# Patient Record
Sex: MALE | Race: BLACK OR AFRICAN AMERICAN | NOT HISPANIC OR LATINO | URBAN - METROPOLITAN AREA
[De-identification: names, ages, dates, MRNs, and addresses within clinical notes are randomized per-mention and may not be internally consistent; named-entity substitution may affect disease eponyms.]

---

## 2018-03-08 DIAGNOSIS — I10 ESSENTIAL HYPERTENSION: Primary | ICD-10-CM

## 2018-03-08 DIAGNOSIS — E78.49 OTHER HYPERLIPIDEMIA: ICD-10-CM

## 2018-03-08 RX ORDER — ATORVASTATIN CALCIUM 20 MG/1
1 TABLET, FILM COATED ORAL DAILY
COMMUNITY
Start: 2017-09-21 | End: 2018-03-08 | Stop reason: SDUPTHER

## 2018-03-08 RX ORDER — SPIRONOLACTONE 50 MG/1
1 TABLET, FILM COATED ORAL DAILY
COMMUNITY
Start: 2017-10-04 | End: 2023-05-18

## 2018-03-08 RX ORDER — ALLOPURINOL 300 MG/1
1 TABLET ORAL DAILY
COMMUNITY
Start: 2017-09-21 | End: 2018-03-14 | Stop reason: SDUPTHER

## 2018-03-08 RX ORDER — PANTOPRAZOLE SODIUM 40 MG/1
1 TABLET, DELAYED RELEASE ORAL DAILY
COMMUNITY
Start: 2017-10-31 | End: 2023-05-18

## 2018-03-08 RX ORDER — AMLODIPINE BESYLATE 10 MG/1
10 TABLET ORAL DAILY
Qty: 90 TABLET | Refills: 1 | Status: SHIPPED | OUTPATIENT
Start: 2018-03-08 | End: 2018-06-22 | Stop reason: SDUPTHER

## 2018-03-08 RX ORDER — ATORVASTATIN CALCIUM 20 MG/1
20 TABLET, FILM COATED ORAL DAILY
Qty: 90 TABLET | Refills: 1 | Status: SHIPPED | OUTPATIENT
Start: 2018-03-08 | End: 2018-06-22 | Stop reason: SDUPTHER

## 2018-03-08 RX ORDER — AMLODIPINE BESYLATE 10 MG/1
1 TABLET ORAL DAILY
COMMUNITY
Start: 2017-09-21 | End: 2018-03-08 | Stop reason: SDUPTHER

## 2018-03-08 RX ORDER — MINERAL OIL
1 ENEMA (ML) RECTAL DAILY
COMMUNITY
Start: 2012-12-06 | End: 2023-05-18

## 2018-03-08 RX ORDER — QUINAPRIL 40 MG/1
1 TABLET ORAL DAILY
COMMUNITY
Start: 2017-10-04 | End: 2018-04-02

## 2018-03-14 RX ORDER — ALLOPURINOL 300 MG/1
300 TABLET ORAL DAILY
Qty: 90 TABLET | Refills: 0 | Status: SHIPPED | OUTPATIENT
Start: 2018-03-14 | End: 2018-04-14 | Stop reason: SDUPTHER

## 2018-04-12 ENCOUNTER — PATIENT OUTREACH (OUTPATIENT)
Dept: CASE MANAGEMENT | Facility: CLINIC | Age: 53
End: 2018-04-12

## 2018-04-12 NOTE — PROGRESS NOTES
NAME: Camden Sheth    MRN: [id number]    YOB: 1965    EVENT DETAILS    Discharging Facility: [facility]  Date of Admission: 04/09/18  Date of Discharge: 04/11/18  Discharge Instructions Reviewed?: Yes       Wife clarified that pt sees Dr. Muhammad.     Reason for Admission:  Bezoar      HPI: Spoke with pt's wife as pt had returned to work. Pt admitted with a recurrent Bezoar. Per wife, pt has had multiple episodes and he is unable to manage the pain at home. He requires high doses of pain medication and nausea medication. In the past, pt required surgery. Wife says they try to avoid surgery because pt developed kidney disease after the surgery.     Per wife, pt still has abdominal pain, but more manageable. Pt was told in the past to limit skins on fruits and vegetables. Wife would like pt to see a dietician. Encouraged her to check their benefit and have PCP write a script, especially with his renal disease. Encouraged wife/pt to discuss with PCP.         MEDICATION REVIEW:    Reported by:: Spouse or Significant Other  Prescriptions Filled?: Yes     Was a medication discrepancy indentified?: No     Medication understanding?: Yes     Medication Adherence?: Yes     Any side effects from medication?: No       Medication review Reviewed, see medication history    Additional Comments:      FOLLOW-UP TESTS/PROCEDURES:    PCP: will have office assist with appt      BARRIERS TO CARE    Self-Care   Living Arrangement: Spouse or Significant Other (Pt lives with his wife and daughter. Pt independent in his care. Pt works.)       Socioeconomic  Financial Problems?: No     Transportation Issues?: No            PLAN OF CARE:    Reviewed signs/symptoms of worsening condition or complication that necessitate a call to the Physician's office.  Educated patient on access to care.  RN phone number given for future care management needs.       Ronit Wells RN  [phone]

## 2018-04-13 ENCOUNTER — TELEPHONE (OUTPATIENT)
Dept: FAMILY MEDICINE | Facility: CLINIC | Age: 53
End: 2018-04-13

## 2018-04-13 NOTE — TELEPHONE ENCOUNTER
Called pt to schedule TCM appointment   And pt's wife said patient will be following up with   GI specialist for his symptoms.    Pt's wife however did reschedule pt's PE appointment   New date is 5/8/2018.          Please see message from care nurse  Listed below    From: Ronit Wells RN   Sent: 4/12/2018  10:26 AM   To: Roseanna Gonzales   Subject: TCM appt needed                                   TCM appt needed. Pt discharged from [facility] 4/11 with a Bezoar. Ideally, pt should be seen by 4/25. Please call wife to schedule. She has an appt on 4/16 and was hoping they could come in together. Please call wife on her cell [phone]. Thanks. Ronit

## 2018-04-16 RX ORDER — ALLOPURINOL 300 MG/1
TABLET ORAL
Qty: 90 TABLET | Refills: 1 | Status: SHIPPED | OUTPATIENT
Start: 2018-04-16 | End: 2018-10-31 | Stop reason: SDUPTHER

## 2018-05-08 ENCOUNTER — OFFICE VISIT (OUTPATIENT)
Dept: FAMILY MEDICINE | Facility: CLINIC | Age: 53
End: 2018-05-08
Payer: COMMERCIAL

## 2018-05-08 VITALS
WEIGHT: 242.8 LBS | DIASTOLIC BLOOD PRESSURE: 82 MMHG | SYSTOLIC BLOOD PRESSURE: 122 MMHG | BODY MASS INDEX: 31.16 KG/M2 | OXYGEN SATURATION: 98 % | HEART RATE: 66 BPM | HEIGHT: 74 IN | TEMPERATURE: 98 F

## 2018-05-08 DIAGNOSIS — R80.9 PROTEINURIA, UNSPECIFIED TYPE: ICD-10-CM

## 2018-05-08 DIAGNOSIS — E78.2 MULTIPLE-TYPE HYPERLIPIDEMIA: ICD-10-CM

## 2018-05-08 DIAGNOSIS — I10 BENIGN ESSENTIAL HYPERTENSION: ICD-10-CM

## 2018-05-08 DIAGNOSIS — M54.5 CHRONIC LOW BACK PAIN, UNSPECIFIED BACK PAIN LATERALITY, WITH SCIATICA PRESENCE UNSPECIFIED: ICD-10-CM

## 2018-05-08 DIAGNOSIS — G89.29 CHRONIC LOW BACK PAIN, UNSPECIFIED BACK PAIN LATERALITY, WITH SCIATICA PRESENCE UNSPECIFIED: ICD-10-CM

## 2018-05-08 DIAGNOSIS — R73.01 IMPAIRED FASTING GLUCOSE: ICD-10-CM

## 2018-05-08 DIAGNOSIS — Z00.00 ENCOUNTER FOR GENERAL ADULT MEDICAL EXAMINATION WITHOUT ABNORMAL FINDINGS: Primary | ICD-10-CM

## 2018-05-08 PROBLEM — M54.50 CHRONIC LOW BACK PAIN: Status: ACTIVE | Noted: 2018-05-08

## 2018-05-08 PROCEDURE — 99396 PREV VISIT EST AGE 40-64: CPT | Performed by: FAMILY MEDICINE

## 2018-05-08 RX ORDER — TRAMADOL HYDROCHLORIDE 50 MG/1
50 TABLET ORAL DAILY PRN
Refills: 0 | COMMUNITY
Start: 2018-04-24 | End: 2018-05-08 | Stop reason: SDUPTHER

## 2018-05-08 RX ORDER — TRAMADOL HYDROCHLORIDE 50 MG/1
50 TABLET ORAL DAILY PRN
Qty: 30 TABLET | Refills: 0 | Status: SHIPPED | OUTPATIENT
Start: 2018-05-08 | End: 2018-06-07

## 2018-05-08 RX ORDER — CYCLOBENZAPRINE HCL 10 MG
10 TABLET ORAL DAILY PRN
Qty: 30 TABLET | Refills: 1 | Status: SHIPPED | OUTPATIENT
Start: 2018-05-08 | End: 2018-08-06 | Stop reason: SDUPTHER

## 2018-05-08 ASSESSMENT — ENCOUNTER SYMPTOMS
VOMITING: 0
CONSTIPATION: 0
SINUS PRESSURE: 0
FEVER: 0
HEMATURIA: 0
DIZZINESS: 0
WHEEZING: 0
ABDOMINAL PAIN: 0
SORE THROAT: 0
EYE PAIN: 0
DIARRHEA: 0
NUMBNESS: 0
NERVOUS/ANXIOUS: 0
ARTHRALGIAS: 0
EYE ITCHING: 0
SHORTNESS OF BREATH: 0
COUGH: 0
PALPITATIONS: 0
CHILLS: 0
NAUSEA: 0
BACK PAIN: 0
DYSURIA: 0
VOICE CHANGE: 0

## 2018-05-08 NOTE — ASSESSMENT & PLAN NOTE
Stable, cont good bp control to prevent worsening of ckd  Decrease salt intake  Increase cardiovascular exercise

## 2018-05-08 NOTE — ASSESSMENT & PLAN NOTE
Counseled diet/nutrition and exercise  Pt to have cmp done through Dr. Tejeda's office. Will monitor

## 2018-05-08 NOTE — PROGRESS NOTES
"Subjective      Patient ID: Camden Sheth is a 52 y.o. male.    HPI    Pt is here for physical exam and health maintenance visit.   Reports hospital visit 2 weeks ago for difficulty swallowing, which is now resolved.  He endorses history of acid reflux,which he says is well controlle with current medication. Pt is aware that certain foods affect it and is trying to maintain a balanced healthy diet.   Exercise through bike riding and local sports, basketball and baseball, with friends.    Specialist follow ups:  To see nephrology, Dr Tejeda, this month for lab work and followup.   Reports colonoscopy done \" a few years ago.\" Report in system from 2011, with follow up in 10 years. Will see GI, Dr Bernardo, in June.            Review of Systems     ROS reviewed and otherwise negative.       Objective     Vitals:    05/08/18 1101   Pulse: 66   Temp: 36.7 °C (98 °F)   SpO2: 98%   Weight: 110 kg (242 lb 12.8 oz)   Height: 1.88 m (6' 2\")     Body mass index is 31.17 kg/m².     Bp: 122/ 82       Physical Exam      Assessment and Plan:  1. Hypertension  Reading today was within normal limits, thus no medication changes suggested at this point.   Continue to maintain healthy lifestyle with diet and exercise as discussed.     2. Hyperlipidemia   Blood work required to assess current status. To get lipid panel completed as well when he goes for nephrology blood work. Continue statin therapy.     3. To follow up with nephrology and GI as noted above     "

## 2018-05-08 NOTE — ASSESSMENT & PLAN NOTE
Cont tramadol and cyclobenzaprine one tablet daily prn  Pt has been seen by ortho in past  No surgical intervention  Pa pdmp verified

## 2018-05-08 NOTE — PROGRESS NOTES
Subjective      Patient ID: Camden Sheth is a 52 y.o. male.      HPI  Patient presents today for physical exam.    Social History     Social History   • Marital status:      Spouse name: N/A   • Number of children: N/A   • Years of education: N/A     Occupational History   • Not on file.     Social History Main Topics   • Smoking status: Never Smoker   • Smokeless tobacco: Never Used   • Alcohol use No   • Drug use: No   • Sexual activity: Yes     Partners: Female     Other Topics Concern   • Not on file     Social History Narrative   • No narrative on file       Family History   Problem Relation Age of Onset   • Diabetes Mother    • Hypertension Mother    • Hypertension Father    • Diabetes Brother        Past Surgical History:   Procedure Laterality Date   • ABDOMINAL SURGERY  2013       Past Medical History:   Diagnosis Date   • Hypertension    • Proteinuria        Goals     None          The following have been reviewed and updated as appropriate in this visit:  Tobacco  Allergies  Meds  Problems  Med Hx  Surg Hx  Fam Hx  Soc Hx        Review of Systems   Constitutional: Negative for chills and fever.   HENT: Negative for ear pain, hearing loss, postnasal drip, sinus pressure, sore throat and voice change.    Eyes: Negative for pain, itching and visual disturbance.   Respiratory: Negative for cough, shortness of breath and wheezing.    Cardiovascular: Negative for chest pain and palpitations.   Gastrointestinal: Negative for abdominal pain, constipation, diarrhea, nausea and vomiting.   Genitourinary: Negative for dysuria and hematuria.   Musculoskeletal: Negative for arthralgias and back pain.   Skin: Negative for rash.   Neurological: Negative for dizziness and numbness.   Psychiatric/Behavioral: The patient is not nervous/anxious.        Objective     Vitals:    05/08/18 1101   BP: 122/82   Pulse: 66   Temp: 36.7 °C (98 °F)   SpO2: 98%   Weight: 110 kg (242 lb 12.8 oz)   Height: 1.88 m (6'  "2\")     Body mass index is 31.17 kg/m².    Physical Exam   Constitutional: He appears well-developed and well-nourished.   HENT:   Head: Normocephalic and atraumatic.   Right Ear: External ear normal.   Left Ear: External ear normal.   Nose: Nose normal.   Mouth/Throat: Oropharynx is clear and moist.   Eyes: Conjunctivae and EOM are normal. Pupils are equal, round, and reactive to light.   Neck: Normal range of motion. Neck supple. No thyromegaly present.   Cardiovascular: Normal rate, regular rhythm, normal heart sounds and intact distal pulses.  Exam reveals no gallop and no friction rub.    No murmur heard.  Pulmonary/Chest: Effort normal and breath sounds normal. No respiratory distress. He has no wheezes. He has no rales.   Abdominal: Soft. Bowel sounds are normal. There is no tenderness.   Musculoskeletal: He exhibits no edema or tenderness.   Lymphadenopathy:     He has no cervical adenopathy.   Neurological: No cranial nerve deficit.   Skin: Skin is warm and dry. No rash noted. No erythema.   Psychiatric: He has a normal mood and affect. His behavior is normal. Judgment and thought content normal.       Assessment/Plan   Problem List Items Addressed This Visit     Benign essential hypertension     Stable, cont good bp control to prevent worsening of ckd  Decrease salt intake  Increase cardiovascular exercise         Impaired fasting glucose     Counseled diet/nutrition and exercise  Pt to have cmp done through Dr. Tejeda's office. Will monitor         Multiple-type hyperlipidemia     Cont statin  Pt has script to get lipid panel done when he goes to get labs done for Dr. Tejeda         Relevant Orders    Lipid panel    Proteinuria     Managed and followed by dr. Tejeda  Avoid NSAIDs  Good bp control         Chronic low back pain     Cont tramadol and cyclobenzaprine one tablet daily prn  Pt has been seen by ortho in past  No surgical intervention  Pa pdmp verified             Other Visit Diagnoses     Encounter " for general adult medical examination without abnormal findings    -  Primary    counseled diet/nutrition and exercise - focus on stretching to prevent injury (pt plays bball and softball a few times a month)        Bezoar - has follow up with GI at [place] will find out from them about next colonoscopy (has had 2 prior to the age of 52)

## 2018-06-22 DIAGNOSIS — I10 ESSENTIAL HYPERTENSION: ICD-10-CM

## 2018-06-22 DIAGNOSIS — E78.49 OTHER HYPERLIPIDEMIA: ICD-10-CM

## 2018-06-22 RX ORDER — AMLODIPINE BESYLATE 10 MG/1
TABLET ORAL
Qty: 90 TABLET | Refills: 1 | Status: SHIPPED | OUTPATIENT
Start: 2018-06-22 | End: 2019-01-12 | Stop reason: SDUPTHER

## 2018-06-22 RX ORDER — ATORVASTATIN CALCIUM 20 MG/1
TABLET, FILM COATED ORAL
Qty: 90 TABLET | Refills: 1 | Status: SHIPPED | OUTPATIENT
Start: 2018-06-22 | End: 2019-01-12 | Stop reason: SDUPTHER

## 2018-08-04 RX ORDER — TRAMADOL HYDROCHLORIDE 50 MG/1
50 TABLET ORAL EVERY 6 HOURS PRN
Qty: 30 TABLET | Refills: 0 | Status: SHIPPED | OUTPATIENT
Start: 2018-08-04 | End: 2019-10-03 | Stop reason: SDUPTHER

## 2018-08-04 NOTE — PROGRESS NOTES
Received message from call center pt requesting refill on tramadol    Pa pdmp verified last fill may 2018    Also informed pt that since he is a patient of mine and I know his history I am able to fill his medication however under any other circumstance another attending at the office wouldn't take care of this on a weekend.

## 2018-08-06 RX ORDER — CYCLOBENZAPRINE HCL 10 MG
10 TABLET ORAL DAILY PRN
Qty: 30 TABLET | Refills: 1 | Status: SHIPPED | OUTPATIENT
Start: 2018-08-06 | End: 2023-05-18

## 2018-08-27 ENCOUNTER — PATIENT OUTREACH (OUTPATIENT)
Dept: CASE MANAGEMENT | Facility: CLINIC | Age: 53
End: 2018-08-27

## 2018-08-28 NOTE — PROGRESS NOTES
NAME: Camden Sheth    MRN: [id number]    YOB: 1965    EVENT DETAILS    Discharging Facility: [facility]  Date of Admission: 08/21/18  Date of Discharge: 08/25/18  Discharge Instructions Reviewed?: Yes         Reason for Admission:  Abdominal Pain, SBO      HPI: Spoke with pt. Pt says he was admitted with abdominal pain and found to have another SBO. Pt says 2 weeks prior to admission he had an EGD and colonoscopy. Both were normal and unrevealing.     Pt denies abdominal pain, nausea, vomiting, diarrhea, constipation, lightheadedness, dizziness, etc. Pt says he only gets SBO's when he eats fruits and vegetables. Pt says GI instructed him to avoid raw fruits and vegetables. All fruit and vegetables have to be without skin and soft.       MEDICATION REVIEW:    Reported by:: Patient  Prescriptions Filled?: Yes     Was a medication discrepancy indentified?: No     Medication understanding?: Yes     Medication Adherence?: Yes     Any side effects from medication?: No       Medication review Reviewed, see medication history    Additional Comments:      FOLLOW-UP TESTS/PROCEDURES:    PCP: 8/29  GI: As scheduled    HOME MANAGEMENT    Living Arrangement: Spouse or Significant Other (Pt lives with his wife and daughters. Pt independent in his care. Pt works. )       HOME CARE SERVICES    Receiving Home Care Services: No              DURABLE MEDICAL EQUIPMENT    Durable Medical Equipment: None  Oxygen Use: No            BARRIERS TO CARE    SELF-CARE    Living Arrangement: Spouse or Significant Other (Pt lives with his wife and daughters. Pt independent in his care. Pt works. )       SOCIOECONOMIC    Financial Problems?: No     Transportation Issues?: No            INTERVENTION/CARE COORDINATION    Interventions/ Care Coordination: Assisted patient in the scheduling of an appointment, Addressed a knowledge deficit    PLAN OF CARE:    Reviewed signs/symptoms of worsening condition or  complication that necessitate a call to the Physician's office.  Educated patient on access to care.  RN phone number given for future care management needs.       Ronit Wells RN  [phone]

## 2018-10-31 RX ORDER — ALLOPURINOL 300 MG/1
TABLET ORAL
Qty: 90 TABLET | Refills: 0 | Status: SHIPPED | OUTPATIENT
Start: 2018-10-31 | End: 2019-01-12 | Stop reason: SDUPTHER

## 2019-01-12 DIAGNOSIS — I10 ESSENTIAL HYPERTENSION: ICD-10-CM

## 2019-01-12 DIAGNOSIS — E78.49 OTHER HYPERLIPIDEMIA: ICD-10-CM

## 2019-01-14 RX ORDER — AMLODIPINE BESYLATE 10 MG/1
TABLET ORAL
Qty: 90 TABLET | Refills: 0 | Status: SHIPPED | OUTPATIENT
Start: 2019-01-14

## 2019-01-14 RX ORDER — ALLOPURINOL 300 MG/1
TABLET ORAL
Qty: 90 TABLET | Refills: 0 | Status: SHIPPED | OUTPATIENT
Start: 2019-01-14 | End: 2022-04-15 | Stop reason: SDUPTHER

## 2019-01-14 RX ORDER — ATORVASTATIN CALCIUM 20 MG/1
TABLET, FILM COATED ORAL
Qty: 90 TABLET | Refills: 0 | Status: SHIPPED | OUTPATIENT
Start: 2019-01-14

## 2019-10-03 ENCOUNTER — OFFICE VISIT (OUTPATIENT)
Dept: FAMILY MEDICINE | Facility: CLINIC | Age: 54
End: 2019-10-03
Payer: COMMERCIAL

## 2019-10-03 VITALS
WEIGHT: 244.4 LBS | HEIGHT: 74 IN | DIASTOLIC BLOOD PRESSURE: 76 MMHG | TEMPERATURE: 98.3 F | BODY MASS INDEX: 31.37 KG/M2 | HEART RATE: 69 BPM | SYSTOLIC BLOOD PRESSURE: 134 MMHG | OXYGEN SATURATION: 98 %

## 2019-10-03 DIAGNOSIS — E78.2 MULTIPLE-TYPE HYPERLIPIDEMIA: ICD-10-CM

## 2019-10-03 DIAGNOSIS — R73.01 IMPAIRED FASTING GLUCOSE: ICD-10-CM

## 2019-10-03 DIAGNOSIS — I10 BENIGN ESSENTIAL HYPERTENSION: ICD-10-CM

## 2019-10-03 DIAGNOSIS — G89.29 CHRONIC PAIN OF LEFT KNEE: ICD-10-CM

## 2019-10-03 DIAGNOSIS — Z12.5 SCREENING FOR MALIGNANT NEOPLASM OF PROSTATE: ICD-10-CM

## 2019-10-03 DIAGNOSIS — M25.562 CHRONIC PAIN OF LEFT KNEE: ICD-10-CM

## 2019-10-03 DIAGNOSIS — Z00.00 ENCOUNTER FOR GENERAL ADULT MEDICAL EXAMINATION WITHOUT ABNORMAL FINDINGS: Primary | ICD-10-CM

## 2019-10-03 PROBLEM — N18.30 STAGE 3 CHRONIC KIDNEY DISEASE (CMS/HCC): Status: ACTIVE | Noted: 2019-10-03

## 2019-10-03 PROCEDURE — 99396 PREV VISIT EST AGE 40-64: CPT | Performed by: FAMILY MEDICINE

## 2019-10-03 RX ORDER — CARVEDILOL 6.25 MG/1
6.25 TABLET ORAL 2 TIMES DAILY WITH MEALS
COMMUNITY

## 2019-10-03 RX ORDER — TRAMADOL HYDROCHLORIDE 50 MG/1
50 TABLET ORAL EVERY 6 HOURS PRN
Qty: 30 TABLET | Refills: 0 | Status: SHIPPED | OUTPATIENT
Start: 2019-10-03 | End: 2023-05-18

## 2019-10-03 ASSESSMENT — ENCOUNTER SYMPTOMS
DYSURIA: 0
COUGH: 0
FEVER: 0
VOICE CHANGE: 0
WHEEZING: 0
EYE PAIN: 0
EYE ITCHING: 0
NERVOUS/ANXIOUS: 0
SORE THROAT: 0
CHILLS: 0
BACK PAIN: 0
ARTHRALGIAS: 0
NAUSEA: 0
CONSTIPATION: 0
HEMATURIA: 0
PALPITATIONS: 0
DIARRHEA: 0
DIZZINESS: 0
NUMBNESS: 0
ABDOMINAL PAIN: 0
SHORTNESS OF BREATH: 0
SINUS PRESSURE: 0
VOMITING: 0

## 2019-10-03 NOTE — ASSESSMENT & PLAN NOTE
Hx of multiple knee arthroscopies (torn menisci)  Has had synvisc and cortisone injections without relief now  Interested in seeing ortho for knee replacement info  Pt cannot take nsaids secondary to ckd stage 3, tylenol not helping  Will provide one time rx for tramadol until pt can be evaluated by amy  I have queried the state Prescription Drug Monitoring Program [PDMP] and found no suspicious PDMP activity and I will prescribe a controlled medication(s).

## 2019-10-03 NOTE — ASSESSMENT & PLAN NOTE
Followed by dr. Tejeda  Missed last appt because pt was out of town  Aware to reschedule  Avoid nephrotoxins

## 2019-10-03 NOTE — PROGRESS NOTES
Subjective      Patient ID: Camden Sheth is a 53 y.o. male.      HPI  Patient presents today for physical exam.    Social History     Social History   • Marital status:      Spouse name: N/A   • Number of children: N/A   • Years of education: N/A     Occupational History   • [employer]       [employer] management     Social History Main Topics   • Smoking status: Never Smoker   • Smokeless tobacco: Never Used   • Alcohol use No   • Drug use: Yes     Types: Marijuana   • Sexual activity: Yes     Partners: Female     Other Topics Concern   • Not on file     Social History Narrative   • No narrative on file       Family History   Problem Relation Age of Onset   • Hypertension Biological Mother    • Hypertension Biological Father    • Diabetes Biological Father    • Diabetes Brother        Past Surgical History:   Procedure Laterality Date   • ABDOMINAL SURGERY  2013   • KNEE ARTHROSCOPY         Past Medical History:   Diagnosis Date   • Hypertension    • Proteinuria        Goals     None          The following have been reviewed and updated as appropriate in this visit:  Tobacco  Allergies  Meds  Problems  Med Hx  Surg Hx  Fam Hx  Soc Hx        Review of Systems   Constitutional: Negative for chills and fever.   HENT: Negative for ear pain, hearing loss, postnasal drip, sinus pressure, sore throat and voice change.    Eyes: Negative for pain, itching and visual disturbance.   Respiratory: Negative for cough, shortness of breath and wheezing.    Cardiovascular: Negative for chest pain and palpitations.   Gastrointestinal: Negative for abdominal pain, constipation, diarrhea, nausea and vomiting.   Genitourinary: Negative for dysuria and hematuria.   Musculoskeletal: Negative for arthralgias and back pain.   Skin: Negative for rash.   Neurological: Negative for dizziness and numbness.   Psychiatric/Behavioral: The patient is not nervous/anxious.        Objective     Vitals:    10/03/19 1725   BP: 134/76   Pulse:  "69   Temp: 36.8 °C (98.3 °F)   SpO2: 98%   Weight: 111 kg (244 lb 6.4 oz)   Height: 1.867 m (6' 1.5\")     Body mass index is 31.81 kg/m².    Physical Exam   Constitutional: He is oriented to person, place, and time. He appears well-developed and well-nourished.   HENT:   Head: Normocephalic and atraumatic.   Right Ear: External ear normal.   Left Ear: External ear normal.   Nose: Nose normal.   Mouth/Throat: Oropharynx is clear and moist.   Eyes: Pupils are equal, round, and reactive to light. Conjunctivae and EOM are normal.   Neck: Normal range of motion. Neck supple. No thyromegaly present.   Cardiovascular: Normal rate, regular rhythm, normal heart sounds and intact distal pulses.  Exam reveals no gallop and no friction rub.    No murmur heard.  Pulmonary/Chest: Effort normal and breath sounds normal. No respiratory distress. He has no wheezes. He has no rales.   Abdominal: Soft. Bowel sounds are normal. There is no tenderness.   Musculoskeletal: He exhibits no edema or tenderness.   Lymphadenopathy:     He has no cervical adenopathy.   Neurological: He is alert and oriented to person, place, and time. No cranial nerve deficit.   Skin: Skin is warm and dry. No rash noted. No erythema.   Psychiatric: He has a normal mood and affect. His behavior is normal. Judgment and thought content normal.       Assessment/Plan   Problem List Items Addressed This Visit     Benign essential hypertension     Stable, cont current regimen  Counseled low salt/sodium diet and increased cardiovascular exercise  Cont to avoid nephrotoxic drugs (including nsaids - aleve)         Relevant Medications    carvedilol (COREG) 6.25 mg tablet    Other Relevant Orders    CBC and Differential    Comprehensive metabolic panel    Lipid panel    PSA    Hemoglobin A1c    Impaired fasting glucose     Follow up a1c         Relevant Orders    CBC and Differential    Comprehensive metabolic panel    Lipid panel    PSA    Hemoglobin A1c    Multiple-type " hyperlipidemia     Recheck lipid panel  Cont statin         Relevant Orders    CBC and Differential    Comprehensive metabolic panel    Lipid panel    PSA    Hemoglobin A1c    Chronic pain of left knee     Hx of multiple knee arthroscopies (torn menisci)  Has had synvisc and cortisone injections without relief now  Interested in seeing ortho for knee replacement info  Pt cannot take nsaids secondary to ckd stage 3, tylenol not helping  Will provide one time rx for tramadol until pt can be evaluated by amy  I have queried the state Prescription Drug Monitoring Program [PDMP] and found no suspicious PDMP activity and I will prescribe a controlled medication(s).           Relevant Orders    Ambulatory referral to Orthopedic Surgery    CBC and Differential    Comprehensive metabolic panel    Lipid panel    PSA    Hemoglobin A1c      Other Visit Diagnoses     Encounter for general adult medical examination without abnormal findings    -  Primary    counseled diet/nutrition and exercise    Relevant Orders    CBC and Differential    Comprehensive metabolic panel    Lipid panel    PSA    Hemoglobin A1c    Screening for malignant neoplasm of prostate        Relevant Orders    CBC and Differential    Comprehensive metabolic panel    Lipid panel    PSA    Hemoglobin A1c        Declined flu vaccine  utd colonoscopy and tdap

## 2019-10-03 NOTE — ASSESSMENT & PLAN NOTE
Stable, cont current regimen  Counseled low salt/sodium diet and increased cardiovascular exercise  Cont to avoid nephrotoxic drugs (including nsaids - aleve)

## 2019-10-11 LAB
ALBUMIN SERPL-MCNC: 4.7 G/DL (ref 3.5–5.5)
ALBUMIN/GLOB SERPL: 1.7 {RATIO} (ref 1.2–2.2)
ALP SERPL-CCNC: 77 IU/L (ref 39–117)
ALT SERPL-CCNC: 15 IU/L (ref 0–44)
AST SERPL-CCNC: 17 IU/L (ref 0–40)
BASOPHILS # BLD AUTO: 0 X10E3/UL (ref 0–0.2)
BASOPHILS NFR BLD AUTO: 0 %
BILIRUB SERPL-MCNC: 0.3 MG/DL (ref 0–1.2)
BUN SERPL-MCNC: 33 MG/DL (ref 6–24)
BUN/CREAT SERPL: 11 (ref 9–20)
CALCIUM SERPL-MCNC: 9.3 MG/DL (ref 8.7–10.2)
CHLORIDE SERPL-SCNC: 104 MMOL/L (ref 96–106)
CHOLEST SERPL-MCNC: 121 MG/DL (ref 100–199)
CO2 SERPL-SCNC: 21 MMOL/L (ref 20–29)
CREAT SERPL-MCNC: 3 MG/DL (ref 0.76–1.27)
EOSINOPHIL # BLD AUTO: 0.2 X10E3/UL (ref 0–0.4)
EOSINOPHIL NFR BLD AUTO: 4 %
ERYTHROCYTE [DISTWIDTH] IN BLOOD BY AUTOMATED COUNT: 13.5 % (ref 12.3–15.4)
GLOBULIN SER CALC-MCNC: 2.7 G/DL (ref 1.5–4.5)
GLUCOSE SERPL-MCNC: 104 MG/DL (ref 65–99)
HBA1C MFR BLD: 4.8 % (ref 4.8–5.6)
HCT VFR BLD AUTO: 38 % (ref 37.5–51)
HDLC SERPL-MCNC: 32 MG/DL
HGB BLD-MCNC: 12.1 G/DL (ref 13–17.7)
IMM GRANULOCYTES # BLD AUTO: 0 X10E3/UL (ref 0–0.1)
IMM GRANULOCYTES NFR BLD AUTO: 0 %
LAB CORP EGFR IF AFRICN AM: 27 ML/MIN/1.73
LAB CORP EGFR IF NONAFRICN AM: 23 ML/MIN/1.73
LDLC SERPL CALC-MCNC: 70 MG/DL (ref 0–99)
LYMPHOCYTES # BLD AUTO: 1.7 X10E3/UL (ref 0.7–3.1)
LYMPHOCYTES NFR BLD AUTO: 32 %
MCH RBC QN AUTO: 32.3 PG (ref 26.6–33)
MCHC RBC AUTO-ENTMCNC: 31.8 G/DL (ref 31.5–35.7)
MCV RBC AUTO: 101 FL (ref 79–97)
MONOCYTES # BLD AUTO: 0.5 X10E3/UL (ref 0.1–0.9)
MONOCYTES NFR BLD AUTO: 10 %
NEUTROPHILS # BLD AUTO: 2.8 X10E3/UL (ref 1.4–7)
NEUTROPHILS NFR BLD AUTO: 54 %
PLATELET # BLD AUTO: 217 X10E3/UL (ref 150–450)
POTASSIUM SERPL-SCNC: 4.4 MMOL/L (ref 3.5–5.2)
PROT SERPL-MCNC: 7.4 G/DL (ref 6–8.5)
PSA SERPL-MCNC: 1.3 NG/ML (ref 0–4)
RBC # BLD AUTO: 3.75 X10E6/UL (ref 4.14–5.8)
SODIUM SERPL-SCNC: 138 MMOL/L (ref 134–144)
TRIGL SERPL-MCNC: 94 MG/DL (ref 0–149)
VLDLC SERPL CALC-MCNC: 19 MG/DL (ref 5–40)
WBC # BLD AUTO: 5.2 X10E3/UL (ref 3.4–10.8)

## 2020-03-27 ENCOUNTER — TELEPHONE (OUTPATIENT)
Dept: FAMILY MEDICINE | Facility: CLINIC | Age: 55
End: 2020-03-27

## 2020-04-09 RX ORDER — FLUTICASONE PROPIONATE 50 MCG
1 SPRAY, SUSPENSION (ML) NASAL DAILY
Qty: 1 BOTTLE | Refills: 5 | Status: SHIPPED | OUTPATIENT
Start: 2020-04-09 | End: 2022-04-15

## 2021-04-15 ENCOUNTER — TELEPHONE (OUTPATIENT)
Dept: FAMILY MEDICINE | Facility: CLINIC | Age: 56
End: 2021-04-15

## 2021-04-15 NOTE — TELEPHONE ENCOUNTER
Pt woke up this morning with bad neck pain that radiates to the back of his head. Pt states he did receive the COVID19 receive on 04/07 and was wondering if it could be a side effect from the vaccine.

## 2021-04-16 NOTE — TELEPHONE ENCOUNTER
If the neck pain is that severe then he needs to go to ER for evaluation.  He can try some rest, fluids, tylenol otherwise

## 2021-10-20 ENCOUNTER — TRANSCRIBE ORDERS (OUTPATIENT)
Dept: SCHEDULING | Age: 56
End: 2021-10-20
Payer: COMMERCIAL

## 2021-10-20 DIAGNOSIS — M54.16 RADICULOPATHY, LUMBAR REGION: Primary | ICD-10-CM

## 2021-10-20 DIAGNOSIS — M25.561 PAIN IN RIGHT KNEE: ICD-10-CM

## 2021-10-20 DIAGNOSIS — M17.0 BILATERAL PRIMARY OSTEOARTHRITIS OF KNEE: ICD-10-CM

## 2021-10-20 DIAGNOSIS — M25.562 PAIN IN LEFT KNEE: Primary | ICD-10-CM

## 2021-10-26 ENCOUNTER — HOSPITAL ENCOUNTER (OUTPATIENT)
Dept: RADIOLOGY | Age: 56
Discharge: HOME | End: 2021-10-26
Attending: ORTHOPAEDIC SURGERY
Payer: COMMERCIAL

## 2021-10-26 DIAGNOSIS — M25.562 PAIN IN LEFT KNEE: ICD-10-CM

## 2021-10-26 DIAGNOSIS — M17.0 BILATERAL PRIMARY OSTEOARTHRITIS OF KNEE: ICD-10-CM

## 2021-10-26 DIAGNOSIS — M25.561 PAIN IN RIGHT KNEE: ICD-10-CM

## 2021-10-26 DIAGNOSIS — M54.16 RADICULOPATHY, LUMBAR REGION: ICD-10-CM

## 2022-04-15 ENCOUNTER — TELEMEDICINE (OUTPATIENT)
Dept: FAMILY MEDICINE | Facility: CLINIC | Age: 57
End: 2022-04-15
Payer: COMMERCIAL

## 2022-04-15 VITALS — BODY MASS INDEX: 28.62 KG/M2 | WEIGHT: 235 LBS | HEIGHT: 76 IN

## 2022-04-15 DIAGNOSIS — J01.90 ACUTE NON-RECURRENT SINUSITIS, UNSPECIFIED LOCATION: Primary | ICD-10-CM

## 2022-04-15 PROCEDURE — 99213 OFFICE O/P EST LOW 20 MIN: CPT | Mod: 95 | Performed by: FAMILY MEDICINE

## 2022-04-15 PROCEDURE — 3008F BODY MASS INDEX DOCD: CPT | Performed by: FAMILY MEDICINE

## 2022-04-15 RX ORDER — FLUTICASONE PROPIONATE 50 MCG
1 SPRAY, SUSPENSION (ML) NASAL DAILY
Qty: 16 G | Refills: 5 | Status: SHIPPED | OUTPATIENT
Start: 2022-04-15 | End: 2023-05-18

## 2022-04-15 RX ORDER — ALLOPURINOL 300 MG/1
300 TABLET ORAL
Qty: 90 TABLET | Refills: 1 | Status: SHIPPED | OUTPATIENT
Start: 2022-04-15 | End: 2022-09-29

## 2022-04-15 RX ORDER — AMOXICILLIN AND CLAVULANATE POTASSIUM 875; 125 MG/1; MG/1
1 TABLET, FILM COATED ORAL 2 TIMES DAILY
Qty: 14 TABLET | Refills: 0 | Status: SHIPPED | OUTPATIENT
Start: 2022-04-15 | End: 2022-04-22

## 2022-04-15 ASSESSMENT — ENCOUNTER SYMPTOMS
SINUS PRESSURE: 1
SORE THROAT: 0
FATIGUE: 0
SHORTNESS OF BREATH: 0
RHINORRHEA: 1
FEVER: 1
CHILLS: 0
SINUS PAIN: 0
COUGH: 0

## 2022-04-15 NOTE — PROGRESS NOTES
Verification of Patient Location:  The patient affirms they are currently located in the following state: [place]    Request for Consent:    Audio and Video Encounter   Roman, my name is Erma Garcia DO.  Before we proceed, can you please verify your identification by telling me your full name and date of birth?  Can you tell me who is in the room with you?    You and I are about to have a telemedicine check-in or visit because you have requested it.  This is a live video-conference.  I am a real person, speaking to you in real time.  There is no one else with me on the video-conference.  However, when we use ([employer], [employer], etc) it is important for you to know that the video-conference may not be secure or private.  I am not recording this conversation and I am asking you not to record it.  This telemedicine visit will be billed to your health insurance or you, if you are self-insured.  You understand you will be responsible for any copayments or coinsurances that apply to your telemedicine visit.  Communication platform used for this encounter:  [employer] Video Visit (with Zoom integration)     Before starting our telemedicine visit, I am required to get your consent for this virtual check-in or visit by telemedicine. Do you consent?      Patient Response to Request for Consent:  Yes      Visit Documentation:  Subjective     Patient ID: Camden Sheth is a 56 y.o. male.  1965      HPI     Sinus congestion  3 days of sx  Fever 101.4F  +green mucous from nose and from chest  Did a covid test and was negative  Taking mucinex and tylenol  Feels slightly better but no 100%  No more fever  Still congested  Has seasonal allergies and is taking his antihistamine  No nasal spray  Daughter had sinus infection last week and treated       The following have been reviewed and updated as appropriate in this visit:   Tobacco  Allergies  Meds  Problems         Review of Systems   Constitutional:  Positive for fever. Negative for chills and fatigue.   HENT: Positive for congestion, postnasal drip, rhinorrhea and sinus pressure. Negative for ear pain, sinus pain and sore throat.    Respiratory: Negative for cough and shortness of breath.    Cardiovascular: Negative for chest pain.         Assessment/Plan   Diagnoses and all orders for this visit:    Acute non-recurrent sinusitis, unspecified location (Primary)  Comments:  augmentin x 1 week, fluticasone, cont antihistamine, tylenol and mucinex. rest and fluids. f/u if no improvement    Other orders  -     amoxicillin-pot clavulanate (AUGMENTIN) 875-125 mg per tablet; Take 1 tablet by mouth 2 (two) times a day for 7 days.  -     fluticasone propionate (FLONASE) 50 mcg/actuation nasal spray; Administer 1 spray into each nostril daily.  -     allopurinoL (ZYLOPRIM) 300 mg tablet; Take 1 tablet (300 mg total) by mouth once daily.        Time Spent:  I spent 14 minutes on this date of service performing the following activities: obtaining history, documenting and providing counseling and education.

## 2022-09-29 RX ORDER — ALLOPURINOL 300 MG/1
TABLET ORAL
Qty: 90 TABLET | Refills: 3 | Status: SHIPPED | OUTPATIENT
Start: 2022-09-29 | End: 2023-05-19

## 2022-12-14 ENCOUNTER — TELEPHONE (OUTPATIENT)
Dept: FAMILY MEDICINE | Facility: CLINIC | Age: 57
End: 2022-12-14
Payer: COMMERCIAL

## 2023-01-22 ENCOUNTER — APPOINTMENT (EMERGENCY)
Dept: RADIOLOGY | Facility: HOSPITAL | Age: 58
End: 2023-01-22
Attending: EMERGENCY MEDICINE
Payer: COMMERCIAL

## 2023-01-22 ENCOUNTER — HOSPITAL ENCOUNTER (EMERGENCY)
Facility: HOSPITAL | Age: 58
Discharge: HOME | End: 2023-01-22
Attending: EMERGENCY MEDICINE
Payer: COMMERCIAL

## 2023-01-22 VITALS
OXYGEN SATURATION: 98 % | HEART RATE: 88 BPM | SYSTOLIC BLOOD PRESSURE: 142 MMHG | TEMPERATURE: 98 F | RESPIRATION RATE: 18 BRPM | DIASTOLIC BLOOD PRESSURE: 78 MMHG | BODY MASS INDEX: 30.44 KG/M2 | WEIGHT: 250 LBS | HEIGHT: 76 IN

## 2023-01-22 DIAGNOSIS — M54.32 BILATERAL SCIATICA: Primary | ICD-10-CM

## 2023-01-22 DIAGNOSIS — N18.9 ACUTE RENAL FAILURE SUPERIMPOSED ON CHRONIC KIDNEY DISEASE, UNSPECIFIED CKD STAGE, UNSPECIFIED ACUTE RENAL FAILURE TYPE: ICD-10-CM

## 2023-01-22 DIAGNOSIS — M54.31 BILATERAL SCIATICA: Primary | ICD-10-CM

## 2023-01-22 DIAGNOSIS — N17.9 ACUTE RENAL FAILURE SUPERIMPOSED ON CHRONIC KIDNEY DISEASE, UNSPECIFIED CKD STAGE, UNSPECIFIED ACUTE RENAL FAILURE TYPE: ICD-10-CM

## 2023-01-22 LAB
ANION GAP SERPL CALC-SCNC: 13 MEQ/L (ref 3–15)
BASOPHILS # BLD: 0.01 K/UL (ref 0.01–0.1)
BASOPHILS NFR BLD: 0.1 %
BUN SERPL-MCNC: 63 MG/DL (ref 8–20)
CALCIUM SERPL-MCNC: 8.6 MG/DL (ref 8.9–10.3)
CHLORIDE SERPL-SCNC: 97 MEQ/L (ref 98–109)
CO2 SERPL-SCNC: 25 MEQ/L (ref 22–32)
CREAT SERPL-MCNC: 5.5 MG/DL (ref 0.8–1.3)
DIFFERENTIAL METHOD BLD: ABNORMAL
EOSINOPHIL # BLD: 0.37 K/UL (ref 0.04–0.54)
EOSINOPHIL NFR BLD: 3.3 %
ERYTHROCYTE [DISTWIDTH] IN BLOOD BY AUTOMATED COUNT: 11.8 % (ref 11.6–14.4)
FLUAV RNA SPEC QL NAA+PROBE: NEGATIVE
FLUBV RNA SPEC QL NAA+PROBE: NEGATIVE
GFR SERPL CREATININE-BSD FRML MDRD: 13.1 ML/MIN/1.73M*2
GLUCOSE SERPL-MCNC: 224 MG/DL (ref 70–99)
HCT VFR BLDCO AUTO: 39.1 % (ref 40.1–51)
HGB BLD-MCNC: 13.5 G/DL (ref 13.7–17.5)
IMM GRANULOCYTES # BLD AUTO: 0.08 K/UL (ref 0–0.08)
IMM GRANULOCYTES NFR BLD AUTO: 0.7 %
LYMPHOCYTES # BLD: 2.38 K/UL (ref 1.2–3.5)
LYMPHOCYTES NFR BLD: 21 %
MCH RBC QN AUTO: 31.8 PG (ref 28–33.2)
MCHC RBC AUTO-ENTMCNC: 34.5 G/DL (ref 32.2–36.5)
MCV RBC AUTO: 92 FL (ref 83–98)
MONOCYTES # BLD: 0.86 K/UL (ref 0.3–1)
MONOCYTES NFR BLD: 7.6 %
NEUTROPHILS # BLD: 7.62 K/UL (ref 1.7–7)
NEUTS SEG NFR BLD: 67.3 %
NRBC BLD-RTO: 0 %
PDW BLD AUTO: 10.4 FL (ref 9.4–12.4)
PLATELET # BLD AUTO: 214 K/UL (ref 150–350)
POTASSIUM SERPL-SCNC: 3.3 MEQ/L (ref 3.6–5.1)
RBC # BLD AUTO: 4.25 M/UL (ref 4.5–5.8)
RSV RNA SPEC QL NAA+PROBE: NEGATIVE
SARS-COV-2 RNA RESP QL NAA+PROBE: NEGATIVE
SODIUM SERPL-SCNC: 135 MEQ/L (ref 136–144)
WBC # BLD AUTO: 11.32 K/UL (ref 3.8–10.5)

## 2023-01-22 PROCEDURE — 63600000 HC DRUGS/DETAIL CODE: Performed by: EMERGENCY MEDICINE

## 2023-01-22 PROCEDURE — 63700000 HC SELF-ADMINISTRABLE DRUG

## 2023-01-22 PROCEDURE — 85025 COMPLETE CBC W/AUTO DIFF WBC: CPT | Performed by: EMERGENCY MEDICINE

## 2023-01-22 PROCEDURE — 87637 SARSCOV2&INF A&B&RSV AMP PRB: CPT

## 2023-01-22 PROCEDURE — 96374 THER/PROPH/DIAG INJ IV PUSH: CPT

## 2023-01-22 PROCEDURE — 99284 EMERGENCY DEPT VISIT MOD MDM: CPT | Mod: 25

## 2023-01-22 PROCEDURE — 80048 BASIC METABOLIC PNL TOTAL CA: CPT | Performed by: EMERGENCY MEDICINE

## 2023-01-22 PROCEDURE — 72100 X-RAY EXAM L-S SPINE 2/3 VWS: CPT

## 2023-01-22 PROCEDURE — 3E033NZ INTRODUCTION OF ANALGESICS, HYPNOTICS, SEDATIVES INTO PERIPHERAL VEIN, PERCUTANEOUS APPROACH: ICD-10-PCS | Performed by: EMERGENCY MEDICINE

## 2023-01-22 PROCEDURE — 25800000 HC PHARMACY IV SOLUTIONS

## 2023-01-22 PROCEDURE — 36415 COLL VENOUS BLD VENIPUNCTURE: CPT | Performed by: EMERGENCY MEDICINE

## 2023-01-22 PROCEDURE — 63700000 HC SELF-ADMINISTRABLE DRUG: Performed by: EMERGENCY MEDICINE

## 2023-01-22 RX ORDER — OXYCODONE AND ACETAMINOPHEN 5; 325 MG/1; MG/1
1 TABLET ORAL EVERY 6 HOURS PRN
Qty: 10 TABLET | Refills: 0 | Status: SHIPPED | OUTPATIENT
Start: 2023-01-22 | End: 2023-01-22 | Stop reason: SDUPTHER

## 2023-01-22 RX ORDER — PREDNISONE 50 MG/1
50 TABLET ORAL DAILY
Qty: 4 TABLET | Refills: 0 | Status: SHIPPED | OUTPATIENT
Start: 2023-01-22 | End: 2023-05-18

## 2023-01-22 RX ORDER — PREDNISONE 50 MG/1
50 TABLET ORAL DAILY
Qty: 4 TABLET | Refills: 0 | Status: SHIPPED | OUTPATIENT
Start: 2023-01-22 | End: 2023-01-22 | Stop reason: SDUPTHER

## 2023-01-22 RX ORDER — OXYCODONE AND ACETAMINOPHEN 5; 325 MG/1; MG/1
1 TABLET ORAL EVERY 6 HOURS PRN
Qty: 10 TABLET | Refills: 0 | Status: SHIPPED | OUTPATIENT
Start: 2023-01-22 | End: 2023-01-29

## 2023-01-22 RX ORDER — MORPHINE SULFATE 2 MG/ML
4 INJECTION, SOLUTION INTRAMUSCULAR; INTRAVENOUS ONCE
Status: COMPLETED | OUTPATIENT
Start: 2023-01-22 | End: 2023-01-22

## 2023-01-22 RX ORDER — DIAZEPAM 5 MG/1
5 TABLET ORAL ONCE
Status: COMPLETED | OUTPATIENT
Start: 2023-01-22 | End: 2023-01-22

## 2023-01-22 RX ORDER — OXYCODONE AND ACETAMINOPHEN 5; 325 MG/1; MG/1
1 TABLET ORAL ONCE
Status: COMPLETED | OUTPATIENT
Start: 2023-01-22 | End: 2023-01-22

## 2023-01-22 RX ADMIN — SODIUM CHLORIDE 1000 ML: 9 INJECTION, SOLUTION INTRAVENOUS at 19:55

## 2023-01-22 RX ADMIN — OXYCODONE HYDROCHLORIDE AND ACETAMINOPHEN 1 TABLET: 5; 325 TABLET ORAL at 19:53

## 2023-01-22 RX ADMIN — MORPHINE SULFATE 4 MG: 2 INJECTION, SOLUTION INTRAMUSCULAR; INTRAVENOUS at 18:34

## 2023-01-22 RX ADMIN — DIAZEPAM 5 MG: 5 TABLET ORAL at 18:33

## 2023-01-22 ASSESSMENT — ENCOUNTER SYMPTOMS
WOUND: 0
DYSURIA: 0
NECK STIFFNESS: 0
MYALGIAS: 1
CHILLS: 0
TREMORS: 0
BACK PAIN: 1
FEVER: 0
ABDOMINAL PAIN: 0
DIAPHORESIS: 0
WEAKNESS: 0
PALPITATIONS: 0
CONFUSION: 0
DIARRHEA: 0
CONSTIPATION: 0
NECK PAIN: 0
SHORTNESS OF BREATH: 0
RHINORRHEA: 0
SORE THROAT: 1
DECREASED CONCENTRATION: 0
FLANK PAIN: 0
COUGH: 0
NUMBNESS: 1
DIFFICULTY URINATING: 0
FATIGUE: 0
COLOR CHANGE: 0
CHEST TIGHTNESS: 0
VOMITING: 0
FREQUENCY: 0
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
## 2023-01-23 NOTE — DISCHARGE INSTRUCTIONS
You were seen and evaluated today in the emergency department for your lower back pain. An x-ray was performed and did not show any evidence for an acute fracture. Your pain is likely secondary to a bulging disc leading  to sciatica. For your pain, you have been prescribed Percocet to take as needed for pain. You are also being sent home with a prescription for a steroid called Prednisone.     The contact information for a pain specialist has been provided to you in your discharge paperwork. Please call that number within the next week to schedule an outpatient follow up visit. Please also call your orthopedic clinician for a follow up appointment.     Return to the ED for any new or worsening symptoms.

## 2023-02-09 DIAGNOSIS — G89.29 CHRONIC LOW BACK PAIN, UNSPECIFIED BACK PAIN LATERALITY, UNSPECIFIED WHETHER SCIATICA PRESENT: Primary | ICD-10-CM

## 2023-02-09 DIAGNOSIS — M54.50 CHRONIC LOW BACK PAIN, UNSPECIFIED BACK PAIN LATERALITY, UNSPECIFIED WHETHER SCIATICA PRESENT: Primary | ICD-10-CM

## 2023-05-08 NOTE — ED ATTESTATION NOTE
I have personally seen and examined the patient.  I reviewed and agree with the PA/NP's assessment and plan of care, with the following exceptions: None     My examination, assessment, and plan of care of Camden Sheth is as follows:     Vitals noted  Clear lungs  Back: diffuse low back tenderness  Neuro: AxOx3, dorsi and planar flexion strong and equal, sensory wnl       MDM:    58yo male c/o worsening chronic LBP about 1 month ago. Pain worse with movement and radiates down both legs. Denies LE weakness. No urinary complaints. Endorse congestion and sore throat x 1 week.  Had telehealth visit on 12/30/22 and dx sciatica and Rx medrol dosepak.   Saw Dr. Burnham (ortho at [facility]) on 1/13/23 and Rx medrol dosepak and Flexeril.  Symptoms have persisted and are c/w sciatica.  Will check labs, give symptomatic meds, and reassess.     Clinton Hart MD  01/22/23 2010     Libtayo Counseling- I discussed with the patient the risks of Libtayo including but not limited to nausea, vomiting, diarrhea, and bone or muscle pain.  The patient verbalized understanding of the proper use and possible adverse effects of Libtayo.  All of the patient's questions and concerns were addressed.

## 2023-05-17 ENCOUNTER — TELEPHONE (OUTPATIENT)
Dept: FAMILY MEDICINE | Facility: CLINIC | Age: 58
End: 2023-05-17

## 2023-05-17 DIAGNOSIS — Z12.5 PROSTATE CANCER SCREENING: ICD-10-CM

## 2023-05-17 DIAGNOSIS — E78.2 MULTIPLE-TYPE HYPERLIPIDEMIA: ICD-10-CM

## 2023-05-17 DIAGNOSIS — I10 BENIGN ESSENTIAL HYPERTENSION: Primary | ICD-10-CM

## 2023-05-17 NOTE — TELEPHONE ENCOUNTER
Pt called requested script for routine blood test prior to his appt tomorrow with .     Pt's lab is [facility].

## 2023-05-18 ENCOUNTER — OFFICE VISIT (OUTPATIENT)
Dept: FAMILY MEDICINE | Facility: CLINIC | Age: 58
End: 2023-05-18
Payer: COMMERCIAL

## 2023-05-18 VITALS
BODY MASS INDEX: 32.09 KG/M2 | DIASTOLIC BLOOD PRESSURE: 90 MMHG | HEART RATE: 88 BPM | TEMPERATURE: 97.7 F | WEIGHT: 263.6 LBS | SYSTOLIC BLOOD PRESSURE: 138 MMHG | OXYGEN SATURATION: 99 %

## 2023-05-18 DIAGNOSIS — I10 BENIGN ESSENTIAL HYPERTENSION: ICD-10-CM

## 2023-05-18 DIAGNOSIS — N18.4 STAGE 4 CHRONIC KIDNEY DISEASE (CMS/HCC): ICD-10-CM

## 2023-05-18 DIAGNOSIS — E78.2 MULTIPLE-TYPE HYPERLIPIDEMIA: ICD-10-CM

## 2023-05-18 DIAGNOSIS — M1A.9XX0 CHRONIC GOUT INVOLVING TOE WITHOUT TOPHUS, UNSPECIFIED CAUSE, UNSPECIFIED LATERALITY: ICD-10-CM

## 2023-05-18 DIAGNOSIS — D63.1 ANEMIA DUE TO STAGE 4 CHRONIC KIDNEY DISEASE (CMS/HCC): ICD-10-CM

## 2023-05-18 DIAGNOSIS — Z00.00 ADULT GENERAL MEDICAL EXAMINATION: Primary | ICD-10-CM

## 2023-05-18 DIAGNOSIS — N18.4 ANEMIA DUE TO STAGE 4 CHRONIC KIDNEY DISEASE (CMS/HCC): ICD-10-CM

## 2023-05-18 PROBLEM — N18.5 CHRONIC RENAL DISEASE, STAGE V (CMS/HCC): Status: ACTIVE | Noted: 2023-05-18

## 2023-05-18 PROBLEM — N18.5 CHRONIC RENAL DISEASE, STAGE V (CMS/HCC): Status: RESOLVED | Noted: 2023-05-18 | Resolved: 2023-05-18

## 2023-05-18 PROCEDURE — 99396 PREV VISIT EST AGE 40-64: CPT | Mod: GC | Performed by: STUDENT IN AN ORGANIZED HEALTH CARE EDUCATION/TRAINING PROGRAM

## 2023-05-18 PROCEDURE — 3080F DIAST BP >= 90 MM HG: CPT | Performed by: STUDENT IN AN ORGANIZED HEALTH CARE EDUCATION/TRAINING PROGRAM

## 2023-05-18 PROCEDURE — 3075F SYST BP GE 130 - 139MM HG: CPT | Performed by: STUDENT IN AN ORGANIZED HEALTH CARE EDUCATION/TRAINING PROGRAM

## 2023-05-18 PROCEDURE — 3008F BODY MASS INDEX DOCD: CPT | Performed by: STUDENT IN AN ORGANIZED HEALTH CARE EDUCATION/TRAINING PROGRAM

## 2023-05-18 RX ORDER — HYDRALAZINE HYDROCHLORIDE 25 MG/1
25 TABLET, FILM COATED ORAL 2 TIMES DAILY
Qty: 60 TABLET | Refills: 1 | Status: SHIPPED | OUTPATIENT
Start: 2023-05-18 | End: 2023-05-20

## 2023-05-18 NOTE — PROGRESS NOTES
[facility]     Chief Complaint  Camden Sheth is a 57 y.o. male who presents to the office for his annual physical exam.     History of Present Illness    57M with HTN, HLD, IFG, CKD III, gout, chronic LBP    CKD III: seeing his nephrologist tomorrow - Dr Nikhil AMOS    HTN: amlodipine 10 mg daily, carvedilol 6.25 mg bid. Compliant with meds. BP borderline elevated today. Asymptomatic - no CP, SOB, palpitations, LE swelling, lightheadedness or syncope.    HLD: atorvastatin 20 mg daily.     Gout: allopurinol 300 mg daily - no gout flares recently    SH: no tobacco use, no alcohol use, occasional marijuana (medical)  Work:   Home: lives with wife and daughter who is graduating HS - going to college at [employer]. He is a grandfather! Grandson is 4 months old - traveling to [place] this summer to meet him.    Health Maintenance:  Colonoscopy - 3/30/2021 x1 rectal polyp hyperplastic.  Tdap 4/2017      Past Medical History:  Patient Active Problem List    Diagnosis Date Noted   • Adult general medical examination 05/19/2023   • Anemia due to stage 4 chronic kidney disease (CMS/HCC) 05/19/2023   • Chronic pain of left knee 10/03/2019   • Stage 4 chronic kidney disease (CMS/HCC) 10/03/2019   • Chronic low back pain 05/08/2018   • Benign essential hypertension 12/06/2012   • Gout 12/06/2012   • Impaired fasting glucose 12/06/2012   • Multiple-type hyperlipidemia 12/06/2012   • Proteinuria 12/06/2012       Past Surgical History:  Past Surgical History:   Procedure Laterality Date   • ABDOMINAL SURGERY  2013   • KNEE ARTHROSCOPY         Medications:    Current Outpatient Medications:   •  allopurinoL (ZYLOPRIM) 100 mg tablet, Take 1 tablet (100 mg total) by mouth daily., Disp: 90 tablet, Rfl: 1  •  hydrALAZINE (APRESOLINE) 25 mg tablet, Take 1 tablet (25 mg total) by mouth 2 (two) times a day., Disp: 60 tablet, Rfl: 1  •  amLODIPine (NORVASC) 10 mg tablet, TAKE 1 TABLET BY MOUTH   DAILY, Disp: 90 tablet, Rfl: 0  •  atorvastatin (LIPITOR) 20 mg tablet, TAKE 1 TABLET BY MOUTH  DAILY, Disp: 90 tablet, Rfl: 0  •  carvedilol (COREG) 6.25 mg tablet, Take 6.25 mg by mouth 2 (two) times a day with meals., Disp: , Rfl:     Allergies:  Shellfish containing products    Family History:  Family History   Problem Relation Age of Onset   • Hypertension Biological Mother    • Hypertension Biological Father    • Diabetes Biological Father    • Diabetes Biological Brother        Social History:  Social History     Socioeconomic History   • Marital status:    Occupational History   • Occupation: [employer]      Comment: [employer]   Tobacco Use   • Smoking status: Never   • Smokeless tobacco: Never   Substance and Sexual Activity   • Alcohol use: No   • Drug use: Yes     Types: Marijuana   • Sexual activity: Yes     Partners: Female     Social Determinants of Health     Food Insecurity: No Food Insecurity (1/22/2023)    Hunger Vital Sign    • Worried About Running Out of Food in the Last Year: Never true    • Ran Out of Food in the Last Year: Never true     Review of Systems:  Review of Systems   Constitutional: Negative for chills, fatigue, fever and unexpected weight change.   HENT: Negative.    Eyes: Negative.    Respiratory: Negative for cough, chest tightness, shortness of breath and wheezing.    Cardiovascular: Negative for chest pain, palpitations and leg swelling.   Gastrointestinal: Negative for abdominal pain, constipation, diarrhea, nausea and vomiting.   Endocrine: Negative for polydipsia, polyphagia and polyuria.   Genitourinary: Negative for difficulty urinating, dysuria, frequency and urgency.   Musculoskeletal: Negative for arthralgias and back pain.   Neurological: Negative for weakness, light-headedness and headaches.     Physical Exam:  Visit Vitals  /90   Pulse 88   Temp 36.5 °C (97.7 °F)   Wt 120 kg (263 lb 9.6 oz)   SpO2 99%   BMI 32.09 kg/m²     Body mass index is 32.09  kg/m².    Physical Exam  Constitutional:       General: He is not in acute distress.     Appearance: Normal appearance.   HENT:      Head: Normocephalic and atraumatic.      Right Ear: Tympanic membrane normal.      Left Ear: Tympanic membrane normal.      Nose: Nose normal.      Mouth/Throat:      Mouth: Mucous membranes are moist.   Eyes:      Pupils: Pupils are equal, round, and reactive to light.   Cardiovascular:      Rate and Rhythm: Normal rate and regular rhythm.      Pulses: Normal pulses.      Heart sounds: No murmur heard.  Pulmonary:      Effort: Pulmonary effort is normal. No respiratory distress.      Breath sounds: Normal breath sounds. No wheezing, rhonchi or rales.   Abdominal:      General: There is no distension.      Palpations: Abdomen is soft.      Tenderness: There is no abdominal tenderness.   Musculoskeletal:      Cervical back: Normal range of motion and neck supple.      Right lower leg: No edema.      Left lower leg: No edema.   Skin:     General: Skin is warm and dry.   Neurological:      General: No focal deficit present.      Mental Status: He is alert and oriented to person, place, and time.       Labs/Imaging Studies:  Lab Results   Component Value Date    WBC 5.9 05/18/2023    HGB 10.8 (L) 05/18/2023    HCT 34.2 (L) 05/18/2023    MCV 95 05/18/2023     05/18/2023     Lab Results   Component Value Date    GLUCOSE 104 (H) 05/18/2023    CALCIUM 8.6 (L) 01/22/2023     05/18/2023    K 3.9 05/18/2023    CO2 20 05/18/2023    CL 98 05/18/2023    BUN 38 (H) 05/18/2023    CREATININE 4.51 (H) 05/18/2023     Lab Results   Component Value Date    CHOL 110 05/18/2023     Lab Results   Component Value Date    HDL 32 (L) 05/18/2023     Lab Results   Component Value Date    LDLCALC 62 05/18/2023     Lab Results   Component Value Date    TRIG 75 05/18/2023       No results found for: CHOLHDL  Lab Results   Component Value Date    HGBA1C 5.1 05/18/2023     Assessment and Plan:  Problem  List Items Addressed This Visit        Circulatory    Benign essential hypertension     Borderline elevated  Add hydralazine 25 mg bid to current regimen - amlodipine 10 and carvedilol 6.25 mg bid. (did not tolerate spironolactone in the past)    Estimated Creatinine Clearance: 26.2 mL/min (A) (by C-G formula based on SCr of 4.51 mg/dL (H)).  Has scheduled follow up with nephrologist on 5/19 - advised pt to have his BP repeated there and determine if nephro agrees with addition of hydralazine to regimen.    Discussed DASH diet and regular exercise per AHA recommendations         Relevant Medications    hydrALAZINE (APRESOLINE) 25 mg tablet       Genitourinary    Stage 4 chronic kidney disease (CMS/HCC)     Stable, follows nephrology  Continue to optimize BP control, and BG control  Encouraged hydration   Avoid nephrotoxic meds - NSAIDs              Hematologic    Anemia due to stage 4 chronic kidney disease (CMS/HCC)     No symptomatic anemia - discussed to follow up with nephrology, may need EPO  UTD to colonoscopy            Other    Gout     Stable, no recent flares  Recommend to reduce dose of allopurinol to 100 mg daily based on renal function.          Relevant Medications    allopurinoL (ZYLOPRIM) 100 mg tablet    Multiple-type hyperlipidemia     Stable, continue statin at current dose         Adult general medical examination - Primary     UTD on age-appropriate cancer screening  Recommend to get shingrix at local pharmacy    Additional counseling as below:  -Exercise goal is 150 min/wk of moderate-intensity exercise and 2 days per week of flexibility and resistance exercises.   -Advised healthy dietary choices, small food portions and exercise.  -Advised patient to complete yearly dental examinations, brushing and flossing daily.  -Advised patient to complete yearly eye examinations with optometrist yearly  -Encouraged enhanced safety by wearing seat belts, checking smoke and CO detectors  -Counseled on  safe sex practices  -Counseled pt regarding adequate 7 hrs sleep every night                Nelda Ibarra MD  05/19/23  9:13 PM

## 2023-05-19 PROBLEM — N18.4 ANEMIA DUE TO STAGE 4 CHRONIC KIDNEY DISEASE (CMS/HCC): Status: ACTIVE | Noted: 2023-05-19

## 2023-05-19 PROBLEM — Z00.00 ADULT GENERAL MEDICAL EXAMINATION: Status: ACTIVE | Noted: 2023-05-19

## 2023-05-19 PROBLEM — D63.1 ANEMIA DUE TO STAGE 4 CHRONIC KIDNEY DISEASE (CMS/HCC): Status: ACTIVE | Noted: 2023-05-19

## 2023-05-19 LAB
ALBUMIN SERPL-MCNC: 4.7 G/DL (ref 3.8–4.9)
ALBUMIN/CREAT UR: 873 MG/G CREAT (ref 0–29)
ALBUMIN/GLOB SERPL: 1.7 {RATIO} (ref 1.2–2.2)
ALP SERPL-CCNC: 88 IU/L (ref 44–121)
ALT SERPL-CCNC: 10 IU/L (ref 0–44)
AST SERPL-CCNC: 17 IU/L (ref 0–40)
BASOPHILS # BLD AUTO: 0 X10E3/UL (ref 0–0.2)
BASOPHILS NFR BLD AUTO: 1 %
BILIRUB SERPL-MCNC: 0.3 MG/DL (ref 0–1.2)
BUN SERPL-MCNC: 38 MG/DL (ref 6–24)
BUN/CREAT SERPL: 8 (ref 9–20)
CALCIUM SERPL-MCNC: 8.9 MG/DL (ref 8.7–10.2)
CHLORIDE SERPL-SCNC: 98 MMOL/L (ref 96–106)
CHOLEST SERPL-MCNC: 110 MG/DL (ref 100–199)
CO2 SERPL-SCNC: 20 MMOL/L (ref 20–29)
CREAT SERPL-MCNC: 4.51 MG/DL (ref 0.76–1.27)
CREAT UR-MCNC: 42.2 MG/DL
EGFRCR SERPLBLD CKD-EPI 2021: 15 ML/MIN/1.73
EOSINOPHIL # BLD AUTO: 0.2 X10E3/UL (ref 0–0.4)
EOSINOPHIL NFR BLD AUTO: 4 %
ERYTHROCYTE [DISTWIDTH] IN BLOOD BY AUTOMATED COUNT: 14.6 % (ref 11.6–15.4)
GLOBULIN SER CALC-MCNC: 2.7 G/DL (ref 1.5–4.5)
GLUCOSE SERPL-MCNC: 104 MG/DL (ref 70–99)
HBA1C MFR BLD: 5.1 % (ref 4.8–5.6)
HCT VFR BLD AUTO: 34.2 % (ref 37.5–51)
HDLC SERPL-MCNC: 32 MG/DL
HGB BLD-MCNC: 10.8 G/DL (ref 13–17.7)
IMM GRANULOCYTES # BLD AUTO: 0 X10E3/UL (ref 0–0.1)
IMM GRANULOCYTES NFR BLD AUTO: 0 %
LDLC SERPL CALC-MCNC: 62 MG/DL (ref 0–99)
LYMPHOCYTES # BLD AUTO: 1.4 X10E3/UL (ref 0.7–3.1)
LYMPHOCYTES NFR BLD AUTO: 23 %
MCH RBC QN AUTO: 30.1 PG (ref 26.6–33)
MCHC RBC AUTO-ENTMCNC: 31.6 G/DL (ref 31.5–35.7)
MCV RBC AUTO: 95 FL (ref 79–97)
MICROALBUMIN UR-MCNC: 368.3 UG/ML
MONOCYTES # BLD AUTO: 0.6 X10E3/UL (ref 0.1–0.9)
MONOCYTES NFR BLD AUTO: 10 %
NEUTROPHILS # BLD AUTO: 3.7 X10E3/UL (ref 1.4–7)
NEUTROPHILS NFR BLD AUTO: 62 %
PLATELET # BLD AUTO: 238 X10E3/UL (ref 150–450)
POTASSIUM SERPL-SCNC: 3.9 MMOL/L (ref 3.5–5.2)
PROT SERPL-MCNC: 7.4 G/DL (ref 6–8.5)
RBC # BLD AUTO: 3.59 X10E6/UL (ref 4.14–5.8)
SODIUM SERPL-SCNC: 137 MMOL/L (ref 134–144)
TRIGL SERPL-MCNC: 75 MG/DL (ref 0–149)
VLDLC SERPL CALC-MCNC: 16 MG/DL (ref 5–40)
WBC # BLD AUTO: 5.9 X10E3/UL (ref 3.4–10.8)

## 2023-05-19 RX ORDER — ALLOPURINOL 100 MG/1
100 TABLET ORAL DAILY
Qty: 90 TABLET | Refills: 1 | Status: SHIPPED | OUTPATIENT
Start: 2023-05-19 | End: 2024-10-31

## 2023-05-19 ASSESSMENT — ENCOUNTER SYMPTOMS
FREQUENCY: 0
WEAKNESS: 0
SHORTNESS OF BREATH: 0
BACK PAIN: 0
CHEST TIGHTNESS: 0
CONSTIPATION: 0
HEADACHES: 0
WHEEZING: 0
NAUSEA: 0
DYSURIA: 0
ARTHRALGIAS: 0
FEVER: 0
ABDOMINAL PAIN: 0
DIARRHEA: 0
PALPITATIONS: 0
DIFFICULTY URINATING: 0
POLYPHAGIA: 0
COUGH: 0
FATIGUE: 0
EYES NEGATIVE: 1
CHILLS: 0
LIGHT-HEADEDNESS: 0
POLYDIPSIA: 0
VOMITING: 0
UNEXPECTED WEIGHT CHANGE: 0

## 2023-05-20 DIAGNOSIS — N18.4 ANEMIA DUE TO STAGE 4 CHRONIC KIDNEY DISEASE (CMS/HCC): Primary | ICD-10-CM

## 2023-05-20 DIAGNOSIS — D63.1 ANEMIA DUE TO STAGE 4 CHRONIC KIDNEY DISEASE (CMS/HCC): Primary | ICD-10-CM

## 2023-05-20 LAB
PSA FREE MFR SERPL: 46.1 %
PSA FREE SERPL-MCNC: 0.83 NG/ML
PSA SERPL-MCNC: 1.8 NG/ML (ref 0–4)

## 2023-05-20 RX ORDER — FERROUS SULFATE 325(65) MG
65 TABLET ORAL
COMMUNITY

## 2023-05-20 NOTE — ASSESSMENT & PLAN NOTE
UTD on age-appropriate cancer screening  Recommend to get shingrix at local pharmacy    Additional counseling as below:  -Exercise goal is 150 min/wk of moderate-intensity exercise and 2 days per week of flexibility and resistance exercises.   -Advised healthy dietary choices, small food portions and exercise.  -Advised patient to complete yearly dental examinations, brushing and flossing daily.  -Advised patient to complete yearly eye examinations with optometrist yearly  -Encouraged enhanced safety by wearing seat belts, checking smoke and CO detectors  -Counseled on safe sex practices  -Counseled pt regarding adequate 7 hrs sleep every night

## 2023-05-20 NOTE — ASSESSMENT & PLAN NOTE
Borderline elevated  Add hydralazine 25 mg bid to current regimen - amlodipine 10 and carvedilol 6.25 mg bid. (did not tolerate spironolactone in the past)    Estimated Creatinine Clearance: 26.2 mL/min (A) (by C-G formula based on SCr of 4.51 mg/dL (H)).  Has scheduled follow up with nephrologist on 5/19 - advised pt to have his BP repeated there and determine if nephro agrees with addition of hydralazine to regimen.    Discussed DASH diet and regular exercise per AHA recommendations

## 2023-05-20 NOTE — ASSESSMENT & PLAN NOTE
Stable, no recent flares  Recommend to reduce dose of allopurinol to 100 mg daily based on renal function.

## 2023-05-20 NOTE — ASSESSMENT & PLAN NOTE
Stable, follows nephrology  Continue to optimize BP control, and BG control  Encouraged hydration   Avoid nephrotoxic meds - NSAIDs

## 2023-07-18 RX ORDER — EPINEPHRINE 0.3 MG/.3ML
1 INJECTION SUBCUTANEOUS AS NEEDED
Qty: 1 EACH | Refills: 1 | Status: SHIPPED | OUTPATIENT
Start: 2023-07-18 | End: 2023-08-17

## 2023-12-27 RX ORDER — FLUTICASONE PROPIONATE 50 MCG
1 SPRAY, SUSPENSION (ML) NASAL DAILY
Qty: 16 G | Refills: 5 | Status: SHIPPED | OUTPATIENT
Start: 2023-12-27

## 2024-02-29 ENCOUNTER — APPOINTMENT (RX ONLY)
Dept: URBAN - METROPOLITAN AREA CLINIC 28 | Facility: CLINIC | Age: 59
Setting detail: DERMATOLOGY
End: 2024-02-29

## 2024-02-29 DIAGNOSIS — L20.89 OTHER ATOPIC DERMATITIS: ICD-10-CM | Status: INADEQUATELY CONTROLLED

## 2024-02-29 PROCEDURE — ? MDM - TREATMENT GOALS

## 2024-02-29 PROCEDURE — ? PRESCRIPTION

## 2024-02-29 PROCEDURE — ? COUNSELING

## 2024-02-29 PROCEDURE — 99204 OFFICE O/P NEW MOD 45 MIN: CPT

## 2024-02-29 PROCEDURE — ? PRESCRIPTION MEDICATION MANAGEMENT

## 2024-02-29 RX ORDER — TRIAMCINOLONE ACETONIDE 1 MG/G
CREAM TOPICAL PRN
Qty: 454 | Refills: 2 | Status: ERX | COMMUNITY
Start: 2024-02-29

## 2024-02-29 RX ORDER — TACROLIMUS 1 MG/G
OINTMENT TOPICAL BID
Qty: 180 | Refills: 1 | Status: ERX | COMMUNITY
Start: 2024-02-29

## 2024-02-29 RX ADMIN — TRIAMCINOLONE ACETONIDE: 1 CREAM TOPICAL at 00:00

## 2024-02-29 RX ADMIN — TACROLIMUS: 1 OINTMENT TOPICAL at 00:00

## 2024-02-29 ASSESSMENT — LOCATION ZONE DERM
LOCATION ZONE: TRUNK
LOCATION ZONE: ARM

## 2024-02-29 ASSESSMENT — LOCATION DETAILED DESCRIPTION DERM
LOCATION DETAILED: RIGHT INFERIOR MEDIAL UPPER BACK
LOCATION DETAILED: RIGHT PROXIMAL DORSAL FOREARM
LOCATION DETAILED: EPIGASTRIC SKIN
LOCATION DETAILED: LEFT DISTAL DORSAL FOREARM

## 2024-02-29 ASSESSMENT — LOCATION SIMPLE DESCRIPTION DERM
LOCATION SIMPLE: LEFT FOREARM
LOCATION SIMPLE: RIGHT UPPER BACK
LOCATION SIMPLE: ABDOMEN
LOCATION SIMPLE: RIGHT FOREARM

## 2024-02-29 ASSESSMENT — BSA ECZEMA: % BODY COVERED IN ECZEMA: 10

## 2024-02-29 ASSESSMENT — SEVERITY ASSESSMENT 2020: SEVERITY 2020: MILD

## 2024-02-29 NOTE — PROCEDURE: PRESCRIPTION MEDICATION MANAGEMENT
Detail Level: Zone
Plan: In reserve - Dupixent
Render In Strict Bullet Format?: No
Initiate Treatment: Triamcinolone 0.1% cream- Apply a thin layer to eczema of body for up to 2 weeks at a time then take a break for one week, can repeat cycle as needed for flare-ups\\n\\nTacrolimus 0.1% Ointment-Apply thin layer to areas of eczema on face twice a day as needed. Can use twice a day for eczema on body during off weeks from topical steroids.

## 2024-06-06 ENCOUNTER — TELEPHONE (OUTPATIENT)
Dept: FAMILY MEDICINE | Facility: CLINIC | Age: 59
End: 2024-06-06

## 2024-06-06 NOTE — TELEPHONE ENCOUNTER
Patient called with complaints of severe pain in chest. Call transferred to [facility] for triage.

## 2024-10-30 SDOH — ECONOMIC STABILITY: FOOD INSECURITY: WITHIN THE PAST 12 MONTHS, YOU WORRIED THAT YOUR FOOD WOULD RUN OUT BEFORE YOU GOT MONEY TO BUY MORE.: NEVER TRUE

## 2024-10-30 SDOH — ECONOMIC STABILITY: INCOME INSECURITY: IN THE LAST 12 MONTHS, WAS THERE A TIME WHEN YOU WERE NOT ABLE TO PAY THE MORTGAGE OR RENT ON TIME?: NO

## 2024-10-30 SDOH — ECONOMIC STABILITY: FOOD INSECURITY: WITHIN THE PAST 12 MONTHS, THE FOOD YOU BOUGHT JUST DIDN'T LAST AND YOU DIDN'T HAVE MONEY TO GET MORE.: NEVER TRUE

## 2024-10-30 SDOH — ECONOMIC STABILITY: TRANSPORTATION INSECURITY
IN THE PAST 12 MONTHS, HAS THE LACK OF TRANSPORTATION KEPT YOU FROM MEDICAL APPOINTMENTS OR FROM GETTING MEDICATIONS?: NO

## 2024-10-30 SDOH — ECONOMIC STABILITY: TRANSPORTATION INSECURITY
IN THE PAST 12 MONTHS, HAS LACK OF TRANSPORTATION KEPT YOU FROM MEETINGS, WORK, OR FROM GETTING THINGS NEEDED FOR DAILY LIVING?: NO

## 2024-10-30 ASSESSMENT — SOCIAL DETERMINANTS OF HEALTH (SDOH): IN THE PAST 12 MONTHS, HAS THE ELECTRIC, GAS, OIL, OR WATER COMPANY THREATENED TO SHUT OFF SERVICE IN YOUR HOME?: NO

## 2024-10-31 ENCOUNTER — OFFICE VISIT (OUTPATIENT)
Dept: FAMILY MEDICINE | Facility: CLINIC | Age: 59
End: 2024-10-31
Payer: COMMERCIAL

## 2024-10-31 VITALS
SYSTOLIC BLOOD PRESSURE: 146 MMHG | DIASTOLIC BLOOD PRESSURE: 96 MMHG | WEIGHT: 265.8 LBS | TEMPERATURE: 98 F | OXYGEN SATURATION: 97 % | HEART RATE: 85 BPM | HEIGHT: 75 IN | BODY MASS INDEX: 33.05 KG/M2

## 2024-10-31 DIAGNOSIS — R73.01 IMPAIRED FASTING GLUCOSE: ICD-10-CM

## 2024-10-31 DIAGNOSIS — I10 BENIGN ESSENTIAL HYPERTENSION: ICD-10-CM

## 2024-10-31 DIAGNOSIS — E78.2 MULTIPLE-TYPE HYPERLIPIDEMIA: ICD-10-CM

## 2024-10-31 DIAGNOSIS — N18.4 ANEMIA DUE TO STAGE 4 CHRONIC KIDNEY DISEASE (CMS/HCC): ICD-10-CM

## 2024-10-31 DIAGNOSIS — R53.83 FATIGUE, UNSPECIFIED TYPE: ICD-10-CM

## 2024-10-31 DIAGNOSIS — D63.1 ANEMIA DUE TO STAGE 4 CHRONIC KIDNEY DISEASE (CMS/HCC): ICD-10-CM

## 2024-10-31 DIAGNOSIS — Z00.00 ADULT GENERAL MEDICAL EXAMINATION: Primary | ICD-10-CM

## 2024-10-31 DIAGNOSIS — N18.4 STAGE 4 CHRONIC KIDNEY DISEASE (CMS/HCC): ICD-10-CM

## 2024-10-31 DIAGNOSIS — Z11.59 NEED FOR HEPATITIS C SCREENING TEST: ICD-10-CM

## 2024-10-31 PROCEDURE — 90480 ADMN SARSCOV2 VAC 1/ONLY CMP: CPT

## 2024-10-31 PROCEDURE — 91322 SARSCOV2 VAC 50 MCG/0.5ML IM: CPT

## 2024-10-31 PROCEDURE — 90471 IMMUNIZATION ADMIN: CPT

## 2024-10-31 PROCEDURE — 3077F SYST BP >= 140 MM HG: CPT

## 2024-10-31 PROCEDURE — 99396 PREV VISIT EST AGE 40-64: CPT | Mod: GC,25

## 2024-10-31 PROCEDURE — 3008F BODY MASS INDEX DOCD: CPT

## 2024-10-31 PROCEDURE — 3080F DIAST BP >= 90 MM HG: CPT

## 2024-10-31 PROCEDURE — 90656 IIV3 VACC NO PRSV 0.5 ML IM: CPT

## 2024-10-31 RX ORDER — CHOLECALCIFEROL (VITAMIN D3) 25 MCG
1000 TABLET ORAL DAILY
COMMUNITY
End: 2024-10-31

## 2024-10-31 RX ORDER — CALCITRIOL 0.25 UG/1
0.25 CAPSULE ORAL DAILY
COMMUNITY
Start: 2024-10-04

## 2024-10-31 ASSESSMENT — PATIENT HEALTH QUESTIONNAIRE - PHQ9: SUM OF ALL RESPONSES TO PHQ9 QUESTIONS 1 & 2: 0

## 2024-10-31 NOTE — PROGRESS NOTES
[facility]     CHIEF COMPLAINT   Camden Sheth is a 58 y.o. male who presents to the office for their routine physical exam.     HISTORY OF PRESENT ILLNESS      Home: lives with wife, no pets, secured guns   Dentist: goes regularly every 6 months   Goes to eye doctor regularly   Registered to vote and planning to vote     CKD: followed by Nephrology [facility]   STD: declines       HM Recommendations:    Colon cancer Cancer Screening: 3/30/21- 5 YEAR follow up?   AAA Screening: n/a  ------------------------------------------------------------  Immunizations:    Flu: today  COVID: today  Shingles: will get       Diet: Chicken, Fish, Turkey. Tries to eat low sodium   Exercise: [employer], bike, treadmill, elliptical ,walking. Goes 1-2x a week.       PAST MEDICAL AND SURGICAL HISTORY      PMHx:  Patient Active Problem List    Diagnosis Date Noted    Adult general medical examination 05/19/2023    Anemia due to stage 4 chronic kidney disease (CMS/HCC)  (CMS/HCC) 05/19/2023    Chronic pain of left knee 10/03/2019    Stage 4 chronic kidney disease (CMS/HCC) 10/03/2019    Chronic low back pain 05/08/2018    Benign essential hypertension 12/06/2012    Gout 12/06/2012    Impaired fasting glucose 12/06/2012    Multiple-type hyperlipidemia 12/06/2012    Proteinuria 12/06/2012     PSHx:  Past Surgical History   Procedure Laterality Date    Abdominal surgery  2013    Knee arthroscopy       Reviewed at today's visit with patient.  MEDICATIONS        Current Outpatient Medications:     amLODIPine (NORVASC) 10 mg tablet, TAKE 1 TABLET BY MOUTH  DAILY, Disp: 90 tablet, Rfl: 0    atorvastatin (LIPITOR) 20 mg tablet, TAKE 1 TABLET BY MOUTH  DAILY, Disp: 90 tablet, Rfl: 0    calcitrioL (ROCALTROL) 0.25 mcg capsule, Take 0.25 mcg by mouth daily., Disp: , Rfl:     carvedilol (COREG) 6.25 mg tablet, Take 6.25 mg by mouth 2 (two) times a day with meals., Disp: , Rfl:     ferrous sulfate 325 mg (65 mg iron) tablet, Take 65 mg by  "mouth daily with breakfast., Disp: , Rfl:     fluticasone propionate (FLONASE) 50 mcg/actuation nasal spray, Administer 1 spray into each nostril daily., Disp: 16 g, Rfl: 5  Reviewed at today's visit with patient.  ALLERGIES      Shellfish containing products  Reviewed at today's visit with patient.  FAMILY HISTORY      Family History   Problem Relation Name Age of Onset    Hypertension Biological Mother      Hypertension Biological Father      Diabetes Biological Father      Diabetes Biological Brother       Reviewed at today's visit with patient.  SOCIAL HISTORY      Social History     Socioeconomic History    Marital status:      Spouse name: None    Number of children: None    Years of education: None    Highest education level: None   Occupational History    Occupation: engeneer      Comment: [employer]   Tobacco Use    Smoking status: Never    Smokeless tobacco: Never   Substance and Sexual Activity    Alcohol use: No    Drug use: Yes     Types: Marijuana    Sexual activity: Yes     Partners: Female     Social Drivers of Health     Food Insecurity: No Food Insecurity (10/30/2024)    Hunger Vital Sign     Worried About Running Out of Food in the Last Year: Never true     Ran Out of Food in the Last Year: Never true   Transportation Needs: No Transportation Needs (10/30/2024)    PRAPARE - Transportation     Lack of Transportation (Medical): No     Lack of Transportation (Non-Medical): No   Housing Stability: Unknown (10/30/2024)    Housing Stability Vital Sign     Unable to Pay for Housing in the Last Year: No     Homeless in the Last Year: No     Reviewed at today's visit with patient.  REVIEW OF SYSTEMS      .Review of Systems  PHYSICAL EXAMINATION      Visit Vitals  BP (!) 146/96   Pulse 85   Temp 36.7 °C (98 °F)   Ht 1.905 m (6' 3\")   Wt 121 kg (265 lb 12.8 oz)   SpO2 97%   BMI 33.22 kg/m²     Body mass index is 33.22 kg/m².    Physical Exam  LABS / IMAGING / STUDIES      Lab Results   Component " Value Date    CREATININE 4.51 (H) 05/18/2023     Lab Results   Component Value Date    WBC 5.9 05/18/2023    HGB 10.8 (L) 05/18/2023    HCT 34.2 (L) 05/18/2023    MCV 95 05/18/2023     05/18/2023     Lab Results   Component Value Date    CHOL 110 05/18/2023    CHOL 121 10/10/2019    TRIG 75 05/18/2023    TRIG 94 10/10/2019    HDL 32 (L) 05/18/2023    HDL 32 (L) 10/10/2019     Lab Results   Component Value Date    HGBA1C 5.1 05/18/2023     Lab Results   Component Value Date    MICROALBUR 368.3 05/18/2023        Reviewed at today's visit with patient.     ASSESSMENT AND PLAN   Problem List Items Addressed This Visit          Circulatory    Benign essential hypertension     Continue amlodipine, carvedilol            Genitourinary    Stage 4 chronic kidney disease (CMS/HCC)     Stable  Follows nephrology            Relevant Orders    Comprehensive metabolic panel    CBC and differential       Endocrine/Metabolic    Impaired fasting glucose    Relevant Orders    Hemoglobin A1c    Lipid panel    Microalbumin / creatinine urine ratio    TSH w reflex FT4       Hematologic    Anemia due to stage 4 chronic kidney disease (CMS/HCC)  (CMS/HCC)     UTD on colonoscopy   No symptomatic anemia            Other    Multiple-type hyperlipidemia     Stable  Continue statin         Adult general medical examination - Primary     COVID, flu today   Will return for shingles/get at pharmacy    Additional counseling as below:  -Exercise goal is 150 min/wk of moderate-intensity exercise and 2 days per week of flexibility and resistance exercises.   -Advised healthy dietary choices, small food portions and exercise.  -Advised patient to complete yearly dental examinations, brushing and flossing daily.  -Advised patient to complete yearly eye examinations with optometrist yearly  -Encouraged enhanced safety by wearing seat belts, checking smoke and CO detectors  -Counseled on safe sex practices  -Counseled pt regarding adequate 7 hrs  sleep every night                Other Visit Diagnoses       Fatigue, unspecified type        Relevant Orders    TSH w reflex FT4    Need for hepatitis C screening test        Relevant Orders    Hepatitis C antibody            Health Care Maintenance:    Patient encouraged to increase activity gradually as tolerated with a goal of 150 minutes of aerobic activity per week with 2 sessions of weight training weekly.     Counseled patient on healthy eating (high quality, low carb, low sugar diet).     Advised patient to complete regular dental examinations, brushing and flossing daily.    Encouraged enhanced safety by wearing seat belts, checking smoke and CO detectors.          Obdulia Angeles DO  10/31/24        Some or all of this note has been written using voice recognition software.    Please excuse any typographical errors.

## 2024-10-31 NOTE — PATIENT INSTRUCTIONS
Instructions:   Continue current medications   Get your BLOOD WORK done when you get your labs with your nephrologist   - You will need to be FASTING - 8 hours with no food but ok to drink water or black coffee  Preventative Medicine - Colonoscopy / Fit Test, Mammogram, Covid booster  Ask your insurance about SHINGRIX vaccine coverage. Consider getting it at the pharmacy if its covered there. If it's covered at the doctor's office please call to make a vaccine only appointment. Consider getting it on a day you're not doing much.    Choose healthy foods, exercise for 150 minutes every week, and drink plenty of fluids  Get adequate sleep, goal is 7-9 hours per night   Reach on via [employer] for any non-urgent matters    Follow up in 12 months

## 2024-11-01 NOTE — ASSESSMENT & PLAN NOTE
COVID, flu today   Will return for shingles/get at pharmacy    CBC, CMP, TSH, lipid, microalbumin to be done with next nephro lab    Additional counseling as below:  -Exercise goal is 150 min/wk of moderate-intensity exercise and 2 days per week of flexibility and resistance exercises.   -Advised healthy dietary choices, small food portions and exercise.  -Advised patient to complete yearly dental examinations, brushing and flossing daily.  -Advised patient to complete yearly eye examinations with optometrist yearly  -Encouraged enhanced safety by wearing seat belts, checking smoke and CO detectors  -Counseled on safe sex practices  -Counseled pt regarding adequate 7 hrs sleep every night

## 2024-12-12 LAB — HCV IGG SERPL QL IA: NON REACTIVE

## 2025-04-28 RX ORDER — FLUTICASONE PROPIONATE 50 MCG
1 SPRAY, SUSPENSION (ML) NASAL DAILY
Qty: 16 G | Refills: 5 | Status: SHIPPED | OUTPATIENT
Start: 2025-04-28 | End: 2025-04-29 | Stop reason: SDUPTHER

## 2025-04-29 ENCOUNTER — OFFICE VISIT (OUTPATIENT)
Dept: FAMILY MEDICINE | Facility: CLINIC | Age: 60
End: 2025-04-29
Payer: COMMERCIAL

## 2025-04-29 VITALS
TEMPERATURE: 97.5 F | SYSTOLIC BLOOD PRESSURE: 128 MMHG | OXYGEN SATURATION: 96 % | BODY MASS INDEX: 31.75 KG/M2 | WEIGHT: 254 LBS | DIASTOLIC BLOOD PRESSURE: 86 MMHG | HEART RATE: 76 BPM

## 2025-04-29 DIAGNOSIS — E78.2 MULTIPLE-TYPE HYPERLIPIDEMIA: ICD-10-CM

## 2025-04-29 DIAGNOSIS — K64.9 HEMORRHOIDS, UNSPECIFIED HEMORRHOID TYPE: ICD-10-CM

## 2025-04-29 DIAGNOSIS — G89.29 CHRONIC LOW BACK PAIN, UNSPECIFIED BACK PAIN LATERALITY, UNSPECIFIED WHETHER SCIATICA PRESENT: ICD-10-CM

## 2025-04-29 DIAGNOSIS — I10 BENIGN ESSENTIAL HYPERTENSION: ICD-10-CM

## 2025-04-29 DIAGNOSIS — G89.29 CHRONIC RIGHT-SIDED BACK PAIN, UNSPECIFIED BACK LOCATION: ICD-10-CM

## 2025-04-29 DIAGNOSIS — M54.50 CHRONIC LOW BACK PAIN, UNSPECIFIED BACK PAIN LATERALITY, UNSPECIFIED WHETHER SCIATICA PRESENT: ICD-10-CM

## 2025-04-29 DIAGNOSIS — R73.01 IMPAIRED FASTING GLUCOSE: ICD-10-CM

## 2025-04-29 DIAGNOSIS — M54.9 CHRONIC RIGHT-SIDED BACK PAIN, UNSPECIFIED BACK LOCATION: ICD-10-CM

## 2025-04-29 DIAGNOSIS — N18.5 STAGE 5 CHRONIC KIDNEY DISEASE NOT ON CHRONIC DIALYSIS (CMS/HCC): Primary | ICD-10-CM

## 2025-04-29 PROBLEM — D64.9 ANEMIA, UNSPECIFIED: Status: ACTIVE | Noted: 2023-05-19

## 2025-04-29 PROCEDURE — 3079F DIAST BP 80-89 MM HG: CPT | Performed by: FAMILY MEDICINE

## 2025-04-29 PROCEDURE — 99214 OFFICE O/P EST MOD 30 MIN: CPT | Performed by: FAMILY MEDICINE

## 2025-04-29 PROCEDURE — 3074F SYST BP LT 130 MM HG: CPT | Performed by: FAMILY MEDICINE

## 2025-04-29 PROCEDURE — 3008F BODY MASS INDEX DOCD: CPT | Performed by: FAMILY MEDICINE

## 2025-04-29 RX ORDER — OMEGA-3 FATTY ACIDS 1000 MG
1000 CAPSULE ORAL DAILY
COMMUNITY

## 2025-04-29 RX ORDER — ALLOPURINOL 300 MG/1
300 TABLET ORAL DAILY
COMMUNITY
Start: 2025-04-28

## 2025-04-29 RX ORDER — CARVEDILOL 25 MG/1
25 TABLET ORAL
COMMUNITY
Start: 2025-03-20

## 2025-04-29 RX ORDER — SILDENAFIL 100 MG/1
100 TABLET, FILM COATED ORAL DAILY
COMMUNITY
Start: 2025-04-04

## 2025-04-29 RX ORDER — TESTOSTERONE CYPIONATE 200 MG/ML
200 INJECTION, SOLUTION INTRAMUSCULAR WEEKLY
COMMUNITY
Start: 2025-04-28

## 2025-04-29 RX ORDER — CHOLECALCIFEROL (VITAMIN D3) 25 MCG
1000 TABLET ORAL DAILY
COMMUNITY

## 2025-04-29 RX ORDER — FLUTICASONE PROPIONATE 50 MCG
1 SPRAY, SUSPENSION (ML) NASAL DAILY
Qty: 48 G | Refills: 1 | Status: SHIPPED | OUTPATIENT
Start: 2025-04-29

## 2025-04-29 ASSESSMENT — ENCOUNTER SYMPTOMS
CONSTIPATION: 1
BACK PAIN: 1
SLEEP DISTURBANCE: 0
DYSPHORIC MOOD: 0
LIGHT-HEADEDNESS: 0
VOMITING: 0
FATIGUE: 0
ABDOMINAL PAIN: 0
ROS GI COMMENTS: HX OF HEMORRHOIDS
UNEXPECTED WEIGHT CHANGE: 0
DIZZINESS: 0
ACTIVITY CHANGE: 0
NAUSEA: 0
SHORTNESS OF BREATH: 0
WHEEZING: 0
JOINT SWELLING: 0
AGITATION: 0
APPETITE CHANGE: 0
WEAKNESS: 0
DIFFICULTY URINATING: 0
DIARRHEA: 0
WOUND: 0
BLOOD IN STOOL: 1
PALPITATIONS: 0
COUGH: 0

## 2025-04-29 NOTE — ASSESSMENT & PLAN NOTE
Lab Results   Component Value Date    HGBA1C 5.1 05/18/2023      WNL - continue to monitor     Per care everywhere down to 4.9% most recently

## 2025-04-29 NOTE — ASSESSMENT & PLAN NOTE
Follows nephrology   Now stage 5 CKD - Plan for venous mapping for fistula, dialysis   On transplant list

## 2025-04-29 NOTE — ASSESSMENT & PLAN NOTE
BP WNL - continue carvedilol   Stable, cont current regimen  Counseled low salt/sodium diet and increased cardiovascular exercise

## 2025-04-29 NOTE — PROGRESS NOTES
Subjective      Patient ID: Camden Sheth is a 59 y.o. male.      HPI  Patient presents today for follow up for chronic conditions:    HTN - stable  Stage 5 CKD - on transplant list - stable  Chronic back pain - last PT over 1 year ago, referred to PT for re-eval      Social History     Socioeconomic History    Marital status:      Spouse name: Not on file    Number of children: Not on file    Years of education: Not on file    Highest education level: Not on file   Occupational History    Occupation: engeneer      Comment: [employer]   Tobacco Use    Smoking status: Never    Smokeless tobacco: Never   Substance and Sexual Activity    Alcohol use: No    Drug use: Yes     Types: Marijuana    Sexual activity: Yes     Partners: Female   Other Topics Concern    Not on file   Social History Narrative    Not on file     Social Drivers of Health     Financial Resource Strain: Not on file   Food Insecurity: No Food Insecurity (10/30/2024)    Hunger Vital Sign     Worried About Running Out of Food in the Last Year: Never true     Ran Out of Food in the Last Year: Never true   Transportation Needs: No Transportation Needs (10/30/2024)    PRAPARE - Transportation     Lack of Transportation (Medical): No     Lack of Transportation (Non-Medical): No   Physical Activity: Not on file   Stress: Not on file   Social Connections: Not on file   Intimate Partner Violence: Not on file   Housing Stability: Unknown (10/30/2024)    Housing Stability Vital Sign     Unable to Pay for Housing in the Last Year: No     Number of Times Moved in the Last Year: Not on file     Homeless in the Last Year: No       Family History   Problem Relation Name Age of Onset    Hypertension Biological Mother      Hypertension Biological Father      Diabetes Biological Father      Diabetes Biological Brother         Past Surgical History   Procedure Laterality Date    Abdominal surgery  2013    Knee arthroscopy         Past Medical History:    Diagnosis Date    Hypertension     Proteinuria         Goals    None         The following have been reviewed and updated as appropriate in this visit:   Allergies  Meds  Problems       Review of Systems   Constitutional:  Negative for activity change, appetite change, fatigue and unexpected weight change.   HENT:  Negative for congestion and postnasal drip.    Eyes:  Negative for visual disturbance.   Respiratory:  Negative for cough, shortness of breath and wheezing.    Cardiovascular:  Negative for chest pain and palpitations.   Gastrointestinal:  Positive for blood in stool and constipation. Negative for abdominal pain, diarrhea, nausea and vomiting.        Hx of hemorrhoids    Genitourinary:  Negative for difficulty urinating.   Musculoskeletal:  Positive for back pain. Negative for joint swelling.        Right side mid-low back    Skin:  Negative for rash and wound.   Neurological:  Negative for dizziness, weakness and light-headedness.   Psychiatric/Behavioral:  Negative for agitation, dysphoric mood and sleep disturbance.        Objective     Vitals:    04/29/25 1513   BP: 128/86   BP Location: Right upper arm   Pulse: 76   Temp: 36.4 °C (97.5 °F)   TempSrc: Temporal   SpO2: 96%   Weight: 115 kg (254 lb)     Body mass index is 31.75 kg/m².    Physical Exam  Constitutional:       Appearance: Normal appearance.   HENT:      Head: Normocephalic and atraumatic.      Nose: Nose normal.      Mouth/Throat:      Mouth: Mucous membranes are moist.      Pharynx: Oropharynx is clear.   Eyes:      Conjunctiva/sclera: Conjunctivae normal.   Cardiovascular:      Rate and Rhythm: Normal rate and regular rhythm.      Heart sounds: Normal heart sounds. No murmur heard.  Pulmonary:      Effort: Pulmonary effort is normal.      Breath sounds: Normal breath sounds. No wheezing, rhonchi or rales.   Musculoskeletal:         General: Normal range of motion.      Cervical back: Normal range of motion.   Skin:     General: Skin  is warm and dry.   Neurological:      General: No focal deficit present.      Mental Status: He is alert and oriented to person, place, and time. Mental status is at baseline.   Psychiatric:         Mood and Affect: Mood normal.         Behavior: Behavior normal.         Thought Content: Thought content normal.         Judgment: Judgment normal.         Assessment/Plan   Problem List Items Addressed This Visit          Circulatory    Benign essential hypertension    BP WNL - continue carvedilol   Stable, cont current regimen  Counseled low salt/sodium diet and increased cardiovascular exercise           Relevant Medications    carvediloL (COREG) 25 mg tablet    Hemorrhoids    Referred to colorectal surgery         Relevant Orders    Ambulatory referral to Colorectal Surgery       Genitourinary    Stage 5 chronic kidney disease not on chronic dialysis (CMS/[facility]) - Primary    Follows nephrology   Now stage 5 CKD - Plan for venous mapping for fistula, dialysis   On transplant list              Endocrine/Metabolic    Impaired fasting glucose    Lab Results   Component Value Date    HGBA1C 5.1 05/18/2023      WNL - continue to monitor     Per care everywhere down to 4.9% most recently            Other    Multiple-type hyperlipidemia    Per care everywhere wnl         Relevant Medications    omega-3 fatty acids 1,000 mg capsule    Chronic low back pain    Referred to PT   Has tried otc remedies - tylenol, lidocaine patches (limited d/t renal function)          Other Visit Diagnoses         Chronic right-sided back pain, unspecified back location        Relevant Orders    Ambulatory referral to Physical Therapy          Prevnar 20 - discussed, out of stock today  Will administered at next viist